# Patient Record
Sex: FEMALE | Race: OTHER | HISPANIC OR LATINO
[De-identification: names, ages, dates, MRNs, and addresses within clinical notes are randomized per-mention and may not be internally consistent; named-entity substitution may affect disease eponyms.]

---

## 2022-05-14 ENCOUNTER — TRANSCRIPTION ENCOUNTER (OUTPATIENT)
Age: 27
End: 2022-05-14

## 2022-05-15 ENCOUNTER — RESULT REVIEW (OUTPATIENT)
Age: 27
End: 2022-05-15

## 2022-05-15 ENCOUNTER — INPATIENT (INPATIENT)
Facility: HOSPITAL | Age: 27
LOS: 2 days | Discharge: HOME | End: 2022-05-18
Attending: OBSTETRICS & GYNECOLOGY | Admitting: OBSTETRICS & GYNECOLOGY
Payer: MEDICAID

## 2022-05-15 VITALS — TEMPERATURE: 97 F

## 2022-05-15 DIAGNOSIS — O26.899 OTHER SPECIFIED PREGNANCY RELATED CONDITIONS, UNSPECIFIED TRIMESTER: ICD-10-CM

## 2022-05-15 DIAGNOSIS — Z34.80 ENCOUNTER FOR SUPERVISION OF OTHER NORMAL PREGNANCY, UNSPECIFIED TRIMESTER: ICD-10-CM

## 2022-05-15 DIAGNOSIS — Z3A.00 WEEKS OF GESTATION OF PREGNANCY NOT SPECIFIED: ICD-10-CM

## 2022-05-15 LAB
ABO RH CONFIRMATION: SIGNIFICANT CHANGE UP
ALBUMIN SERPL ELPH-MCNC: 2.4 G/DL — LOW (ref 3.5–5)
ALP SERPL-CCNC: 96 U/L — SIGNIFICANT CHANGE UP (ref 40–120)
ALT FLD-CCNC: 15 U/L DA — SIGNIFICANT CHANGE UP (ref 10–60)
ANION GAP SERPL CALC-SCNC: 7 MMOL/L — SIGNIFICANT CHANGE UP (ref 5–17)
APTT BLD: 26.8 SEC — LOW (ref 27.5–35.5)
AST SERPL-CCNC: 15 U/L — SIGNIFICANT CHANGE UP (ref 10–40)
BASOPHILS # BLD AUTO: 0 K/UL — SIGNIFICANT CHANGE UP (ref 0–0.2)
BASOPHILS # BLD AUTO: 0.05 K/UL — SIGNIFICANT CHANGE UP (ref 0–0.2)
BASOPHILS NFR BLD AUTO: 0 % — SIGNIFICANT CHANGE UP (ref 0–2)
BASOPHILS NFR BLD AUTO: 0.3 % — SIGNIFICANT CHANGE UP (ref 0–2)
BILIRUB SERPL-MCNC: 0.3 MG/DL — SIGNIFICANT CHANGE UP (ref 0.2–1.2)
BLD GP AB SCN SERPL QL: SIGNIFICANT CHANGE UP
BUN SERPL-MCNC: 6 MG/DL — LOW (ref 7–18)
CALCIUM SERPL-MCNC: 9.3 MG/DL — SIGNIFICANT CHANGE UP (ref 8.4–10.5)
CHLORIDE SERPL-SCNC: 108 MMOL/L — SIGNIFICANT CHANGE UP (ref 96–108)
CO2 SERPL-SCNC: 25 MMOL/L — SIGNIFICANT CHANGE UP (ref 22–31)
CREAT SERPL-MCNC: 0.4 MG/DL — LOW (ref 0.5–1.3)
EGFR: 138 ML/MIN/1.73M2 — SIGNIFICANT CHANGE UP
EOSINOPHIL # BLD AUTO: 0.11 K/UL — SIGNIFICANT CHANGE UP (ref 0–0.5)
EOSINOPHIL # BLD AUTO: 0.21 K/UL — SIGNIFICANT CHANGE UP (ref 0–0.5)
EOSINOPHIL NFR BLD AUTO: 0.6 % — SIGNIFICANT CHANGE UP (ref 0–6)
EOSINOPHIL NFR BLD AUTO: 2 % — SIGNIFICANT CHANGE UP (ref 0–6)
GLUCOSE SERPL-MCNC: 73 MG/DL — SIGNIFICANT CHANGE UP (ref 70–99)
HBV SURFACE AG SERPL QL IA: SIGNIFICANT CHANGE UP
HCT VFR BLD CALC: 31.9 % — LOW (ref 34.5–45)
HCT VFR BLD CALC: 33.4 % — LOW (ref 34.5–45)
HCT VFR BLD CALC: 35.4 % — SIGNIFICANT CHANGE UP (ref 34.5–45)
HGB BLD-MCNC: 10 G/DL — LOW (ref 11.5–15.5)
HGB BLD-MCNC: 10.7 G/DL — LOW (ref 11.5–15.5)
HGB BLD-MCNC: 10.9 G/DL — LOW (ref 11.5–15.5)
HIV 1 & 2 AB SERPL IA.RAPID: SIGNIFICANT CHANGE UP
HIV 1+2 AB+HIV1 P24 AG SERPL QL IA: SIGNIFICANT CHANGE UP
IMM GRANULOCYTES NFR BLD AUTO: 1.1 % — SIGNIFICANT CHANGE UP (ref 0–1.5)
INR BLD: 0.97 RATIO — SIGNIFICANT CHANGE UP (ref 0.88–1.16)
LDH SERPL L TO P-CCNC: 160 U/L — SIGNIFICANT CHANGE UP (ref 120–225)
LYMPHOCYTES # BLD AUTO: 0.98 K/UL — LOW (ref 1–3.3)
LYMPHOCYTES # BLD AUTO: 3.61 K/UL — HIGH (ref 1–3.3)
LYMPHOCYTES # BLD AUTO: 35 % — SIGNIFICANT CHANGE UP (ref 13–44)
LYMPHOCYTES # BLD AUTO: 5.8 % — LOW (ref 13–44)
MCHC RBC-ENTMCNC: 23.4 PG — LOW (ref 27–34)
MCHC RBC-ENTMCNC: 24.2 PG — LOW (ref 27–34)
MCHC RBC-ENTMCNC: 24.7 PG — LOW (ref 27–34)
MCHC RBC-ENTMCNC: 30.8 GM/DL — LOW (ref 32–36)
MCHC RBC-ENTMCNC: 31.3 GM/DL — LOW (ref 32–36)
MCHC RBC-ENTMCNC: 32 GM/DL — SIGNIFICANT CHANGE UP (ref 32–36)
MCV RBC AUTO: 76 FL — LOW (ref 80–100)
MCV RBC AUTO: 77 FL — LOW (ref 80–100)
MCV RBC AUTO: 77.1 FL — LOW (ref 80–100)
MONOCYTES # BLD AUTO: 0.56 K/UL — SIGNIFICANT CHANGE UP (ref 0–0.9)
MONOCYTES # BLD AUTO: 0.93 K/UL — HIGH (ref 0–0.9)
MONOCYTES NFR BLD AUTO: 3.3 % — SIGNIFICANT CHANGE UP (ref 2–14)
MONOCYTES NFR BLD AUTO: 9 % — SIGNIFICANT CHANGE UP (ref 2–14)
NEUTROPHILS # BLD AUTO: 15.05 K/UL — HIGH (ref 1.8–7.4)
NEUTROPHILS # BLD AUTO: 5.37 K/UL — SIGNIFICANT CHANGE UP (ref 1.8–7.4)
NEUTROPHILS NFR BLD AUTO: 52 % — SIGNIFICANT CHANGE UP (ref 43–77)
NEUTROPHILS NFR BLD AUTO: 88.9 % — HIGH (ref 43–77)
NRBC # BLD: 0 /100 WBCS — SIGNIFICANT CHANGE UP (ref 0–0)
NRBC # BLD: 0 /100 WBCS — SIGNIFICANT CHANGE UP (ref 0–0)
PLATELET # BLD AUTO: 234 K/UL — SIGNIFICANT CHANGE UP (ref 150–400)
PLATELET # BLD AUTO: 245 K/UL — SIGNIFICANT CHANGE UP (ref 150–400)
PLATELET # BLD AUTO: 288 K/UL — SIGNIFICANT CHANGE UP (ref 150–400)
POTASSIUM SERPL-MCNC: 3.9 MMOL/L — SIGNIFICANT CHANGE UP (ref 3.5–5.3)
POTASSIUM SERPL-SCNC: 3.9 MMOL/L — SIGNIFICANT CHANGE UP (ref 3.5–5.3)
PROT SERPL-MCNC: 6.6 G/DL — SIGNIFICANT CHANGE UP (ref 6–8.3)
PROTHROM AB SERPL-ACNC: 11.5 SEC — SIGNIFICANT CHANGE UP (ref 10.5–13.4)
RBC # BLD: 4.14 M/UL — SIGNIFICANT CHANGE UP (ref 3.8–5.2)
RBC # BLD: 4.34 M/UL — SIGNIFICANT CHANGE UP (ref 3.8–5.2)
RBC # BLD: 4.66 M/UL — SIGNIFICANT CHANGE UP (ref 3.8–5.2)
RBC # FLD: 14.7 % — HIGH (ref 10.3–14.5)
RBC # FLD: 14.8 % — HIGH (ref 10.3–14.5)
RBC # FLD: 15.4 % — HIGH (ref 10.3–14.5)
RUBV IGG SER-ACNC: 7.7 INDEX — SIGNIFICANT CHANGE UP
RUBV IGG SER-IMP: POSITIVE — SIGNIFICANT CHANGE UP
SARS-COV-2 RNA SPEC QL NAA+PROBE: SIGNIFICANT CHANGE UP
SODIUM SERPL-SCNC: 140 MMOL/L — SIGNIFICANT CHANGE UP (ref 135–145)
URATE SERPL-MCNC: 2.7 MG/DL — SIGNIFICANT CHANGE UP (ref 2.5–7)
WBC # BLD: 10.32 K/UL — SIGNIFICANT CHANGE UP (ref 3.8–10.5)
WBC # BLD: 16.93 K/UL — HIGH (ref 3.8–10.5)
WBC # BLD: 8.47 K/UL — SIGNIFICANT CHANGE UP (ref 3.8–10.5)
WBC # FLD AUTO: 10.32 K/UL — SIGNIFICANT CHANGE UP (ref 3.8–10.5)
WBC # FLD AUTO: 16.93 K/UL — HIGH (ref 3.8–10.5)
WBC # FLD AUTO: 8.47 K/UL — SIGNIFICANT CHANGE UP (ref 3.8–10.5)

## 2022-05-15 PROCEDURE — 59514 CESAREAN DELIVERY ONLY: CPT | Mod: U7

## 2022-05-15 PROCEDURE — 58611 LIGATE OVIDUCT(S) ADD-ON: CPT

## 2022-05-15 PROCEDURE — 88302 TISSUE EXAM BY PATHOLOGIST: CPT | Mod: 26

## 2022-05-15 PROCEDURE — 88307 TISSUE EXAM BY PATHOLOGIST: CPT | Mod: 26

## 2022-05-15 RX ORDER — CITRIC ACID/SODIUM CITRATE 300-500 MG
30 SOLUTION, ORAL ORAL ONCE
Refills: 0 | Status: DISCONTINUED | OUTPATIENT
Start: 2022-05-15 | End: 2022-05-15

## 2022-05-15 RX ORDER — ACETAMINOPHEN 500 MG
1000 TABLET ORAL ONCE
Refills: 0 | Status: COMPLETED | OUTPATIENT
Start: 2022-05-15 | End: 2022-05-15

## 2022-05-15 RX ORDER — MORPHINE SULFATE 50 MG/1
4 CAPSULE, EXTENDED RELEASE ORAL EVERY 4 HOURS
Refills: 0 | Status: DISCONTINUED | OUTPATIENT
Start: 2022-05-15 | End: 2022-05-18

## 2022-05-15 RX ORDER — SENNA PLUS 8.6 MG/1
2 TABLET ORAL AT BEDTIME
Refills: 0 | Status: DISCONTINUED | OUTPATIENT
Start: 2022-05-15 | End: 2022-05-15

## 2022-05-15 RX ORDER — FERROUS SULFATE 325(65) MG
325 TABLET ORAL DAILY
Refills: 0 | Status: DISCONTINUED | OUTPATIENT
Start: 2022-05-15 | End: 2022-05-18

## 2022-05-15 RX ORDER — AZITHROMYCIN 500 MG/1
500 TABLET, FILM COATED ORAL ONCE
Refills: 0 | Status: COMPLETED | OUTPATIENT
Start: 2022-05-15 | End: 2022-05-15

## 2022-05-15 RX ORDER — ONDANSETRON 8 MG/1
4 TABLET, FILM COATED ORAL EVERY 6 HOURS
Refills: 0 | Status: DISCONTINUED | OUTPATIENT
Start: 2022-05-15 | End: 2022-05-15

## 2022-05-15 RX ORDER — SIMETHICONE 80 MG/1
80 TABLET, CHEWABLE ORAL EVERY 6 HOURS
Refills: 0 | Status: DISCONTINUED | OUTPATIENT
Start: 2022-05-15 | End: 2022-05-18

## 2022-05-15 RX ORDER — LANOLIN
1 OINTMENT (GRAM) TOPICAL EVERY 6 HOURS
Refills: 0 | Status: DISCONTINUED | OUTPATIENT
Start: 2022-05-15 | End: 2022-05-18

## 2022-05-15 RX ORDER — ACETAMINOPHEN 500 MG
975 TABLET ORAL
Refills: 0 | Status: DISCONTINUED | OUTPATIENT
Start: 2022-05-15 | End: 2022-05-18

## 2022-05-15 RX ORDER — SODIUM CHLORIDE 9 MG/ML
1000 INJECTION, SOLUTION INTRAVENOUS
Refills: 0 | Status: DISCONTINUED | OUTPATIENT
Start: 2022-05-15 | End: 2022-05-18

## 2022-05-15 RX ORDER — OXYTOCIN 10 UNIT/ML
333.33 VIAL (ML) INJECTION
Qty: 20 | Refills: 0 | Status: DISCONTINUED | OUTPATIENT
Start: 2022-05-15 | End: 2022-05-18

## 2022-05-15 RX ORDER — CEFAZOLIN SODIUM 1 G
2000 VIAL (EA) INJECTION ONCE
Refills: 0 | Status: COMPLETED | OUTPATIENT
Start: 2022-05-15 | End: 2022-05-15

## 2022-05-15 RX ORDER — HYDROMORPHONE HYDROCHLORIDE 2 MG/ML
2 INJECTION INTRAMUSCULAR; INTRAVENOUS; SUBCUTANEOUS ONCE
Refills: 0 | Status: DISCONTINUED | OUTPATIENT
Start: 2022-05-15 | End: 2022-05-15

## 2022-05-15 RX ORDER — OXYCODONE HYDROCHLORIDE 5 MG/1
5 TABLET ORAL
Refills: 0 | Status: DISCONTINUED | OUTPATIENT
Start: 2022-05-15 | End: 2022-05-18

## 2022-05-15 RX ORDER — HEPARIN SODIUM 5000 [USP'U]/ML
5000 INJECTION INTRAVENOUS; SUBCUTANEOUS EVERY 12 HOURS
Refills: 0 | Status: DISCONTINUED | OUTPATIENT
Start: 2022-05-16 | End: 2022-05-18

## 2022-05-15 RX ORDER — METOCLOPRAMIDE HCL 10 MG
10 TABLET ORAL ONCE
Refills: 0 | Status: DISCONTINUED | OUTPATIENT
Start: 2022-05-15 | End: 2022-05-15

## 2022-05-15 RX ORDER — OXYTOCIN 10 UNIT/ML
333.33 VIAL (ML) INJECTION
Qty: 20 | Refills: 0 | Status: DISCONTINUED | OUTPATIENT
Start: 2022-05-15 | End: 2022-05-15

## 2022-05-15 RX ORDER — DIPHENHYDRAMINE HCL 50 MG
25 CAPSULE ORAL EVERY 6 HOURS
Refills: 0 | Status: DISCONTINUED | OUTPATIENT
Start: 2022-05-15 | End: 2022-05-18

## 2022-05-15 RX ORDER — SODIUM CHLORIDE 9 MG/ML
1000 INJECTION, SOLUTION INTRAVENOUS
Refills: 0 | Status: DISCONTINUED | OUTPATIENT
Start: 2022-05-15 | End: 2022-05-15

## 2022-05-15 RX ORDER — IBUPROFEN 200 MG
600 TABLET ORAL EVERY 6 HOURS
Refills: 0 | Status: COMPLETED | OUTPATIENT
Start: 2022-05-15 | End: 2023-04-13

## 2022-05-15 RX ORDER — HYDROMORPHONE HYDROCHLORIDE 2 MG/ML
1 INJECTION INTRAMUSCULAR; INTRAVENOUS; SUBCUTANEOUS ONCE
Refills: 0 | Status: DISCONTINUED | OUTPATIENT
Start: 2022-05-15 | End: 2022-05-15

## 2022-05-15 RX ORDER — KETOROLAC TROMETHAMINE 30 MG/ML
30 SYRINGE (ML) INJECTION EVERY 6 HOURS
Refills: 0 | Status: DISCONTINUED | OUTPATIENT
Start: 2022-05-15 | End: 2022-05-17

## 2022-05-15 RX ORDER — MAGNESIUM HYDROXIDE 400 MG/1
30 TABLET, CHEWABLE ORAL
Refills: 0 | Status: DISCONTINUED | OUTPATIENT
Start: 2022-05-15 | End: 2022-05-18

## 2022-05-15 RX ORDER — TETANUS TOXOID, REDUCED DIPHTHERIA TOXOID AND ACELLULAR PERTUSSIS VACCINE, ADSORBED 5; 2.5; 8; 8; 2.5 [IU]/.5ML; [IU]/.5ML; UG/.5ML; UG/.5ML; UG/.5ML
0.5 SUSPENSION INTRAMUSCULAR ONCE
Refills: 0 | Status: DISCONTINUED | OUTPATIENT
Start: 2022-05-15 | End: 2022-05-18

## 2022-05-15 RX ADMIN — Medication 100 MILLIGRAM(S): at 12:45

## 2022-05-15 RX ADMIN — Medication 30 MILLIGRAM(S): at 16:59

## 2022-05-15 RX ADMIN — Medication 400 MILLIGRAM(S): at 20:49

## 2022-05-15 RX ADMIN — HYDROMORPHONE HYDROCHLORIDE 2 MILLIGRAM(S): 2 INJECTION INTRAMUSCULAR; INTRAVENOUS; SUBCUTANEOUS at 16:45

## 2022-05-15 RX ADMIN — HYDROMORPHONE HYDROCHLORIDE 2 MILLIGRAM(S): 2 INJECTION INTRAMUSCULAR; INTRAVENOUS; SUBCUTANEOUS at 16:59

## 2022-05-15 RX ADMIN — Medication 30 MILLIGRAM(S): at 22:56

## 2022-05-15 RX ADMIN — Medication 1000 MILLIGRAM(S): at 21:20

## 2022-05-15 RX ADMIN — Medication 12 MILLIGRAM(S): at 12:30

## 2022-05-15 RX ADMIN — Medication 30 MILLIGRAM(S): at 15:28

## 2022-05-15 RX ADMIN — Medication 1000 MILLIUNIT(S)/MIN: at 15:32

## 2022-05-15 RX ADMIN — HYDROMORPHONE HYDROCHLORIDE 1 MILLIGRAM(S): 2 INJECTION INTRAMUSCULAR; INTRAVENOUS; SUBCUTANEOUS at 15:39

## 2022-05-15 RX ADMIN — HYDROMORPHONE HYDROCHLORIDE 1 MILLIGRAM(S): 2 INJECTION INTRAMUSCULAR; INTRAVENOUS; SUBCUTANEOUS at 12:45

## 2022-05-15 RX ADMIN — Medication 30 MILLIGRAM(S): at 21:20

## 2022-05-15 RX ADMIN — AZITHROMYCIN 255 MILLIGRAM(S): 500 TABLET, FILM COATED ORAL at 12:39

## 2022-05-15 NOTE — CHART NOTE - NSCHARTNOTEFT_GEN_A_CORE
Pt seen at bedside offers no complaints. Denies n/v, cp; sob; palpitations; or dizziness    T(C): 36.7 (05-15-22 @ 17:01), Max: 36.8 (05-15-22 @ 11:35)  HR: 60 (05-15-22 @ 17:01) (58 - 74)  BP: 126/73 (05-15-22 @ 17:01) (118/79 - 131/80)  RR: 16 (05-15-22 @ 17:01) (12 - 18)  SpO2: 97% (05-15-22 @ 17:01) (97% - 100%)    abd: soft/nt, fundus firm, dressing in place C/D/I  pelvic: minimal lochia  ext: venodynes in place; no calf pain    a/p POD #0 s/p stat rpt c/s & btl @ 35.2 wks, complete previa   f/u 7pm cbc  cont close monitor  cont post op care  d/w dr Smith Pt seen at bedside offers no complaints. Denies n/v, cp; sob; palpitations; or dizziness    T(C): 36.7 (05-15-22 @ 17:01), Max: 36.8 (05-15-22 @ 11:35)  HR: 60 (05-15-22 @ 17:01) (58 - 74)  BP: 126/73 (05-15-22 @ 17:01) (118/79 - 131/80)  RR: 16 (05-15-22 @ 17:01) (12 - 18)  SpO2: 97% (05-15-22 @ 17:01) (97% - 100%)    abd: soft/nt, fundus firm, dressing in place C/D/I  pelvic: minimal lochia  ext: venodynes in place; no calf pain    a/p POD #0 s/p stat rpt c/s & btl @ 35.2 wks, complete previa   f/u 7pm cbc  cont close monitor  cont post op care  d/w dr Smith    addendeum with cbc resulted                        10.7   16.93 )-----------( 245      ( 15 May 2022 20:24 )             33.4 Pt seen at bedside offers no complaints. Denies n/v, cp; sob; palpitations; or dizziness    T(C): 36.7 (05-15-22 @ 17:01), Max: 36.8 (05-15-22 @ 11:35)  HR: 60 (05-15-22 @ 17:01) (58 - 74)  BP: 126/73 (05-15-22 @ 17:01) (118/79 - 131/80)  RR: 16 (05-15-22 @ 17:01) (12 - 18)  SpO2: 97% (05-15-22 @ 17:01) (97% - 100%)    abd: soft/nt, fundus firm, dressing in place C/D/I  pelvic: minimal lochia  ext: venodynes in place; no calf pain    a/p POD #0 s/p stat rpt c/s & btl @ 35.2 wks, complete previa   f/u 7pm cbc  cont close monitor  cont post op care  d/w dr Smith    addendum with cbc resulted:                        10.7   16.93 )-----------( 245      ( 15 May 2022 20:24 )             33.4

## 2022-05-16 DIAGNOSIS — Z98.891 HISTORY OF UTERINE SCAR FROM PREVIOUS SURGERY: ICD-10-CM

## 2022-05-16 LAB
BASOPHILS # BLD AUTO: 0.03 K/UL — SIGNIFICANT CHANGE UP (ref 0–0.2)
BASOPHILS NFR BLD AUTO: 0.2 % — SIGNIFICANT CHANGE UP (ref 0–2)
EOSINOPHIL # BLD AUTO: 0 K/UL — SIGNIFICANT CHANGE UP (ref 0–0.5)
EOSINOPHIL NFR BLD AUTO: 0 % — SIGNIFICANT CHANGE UP (ref 0–6)
HCT VFR BLD CALC: 23.6 % — LOW (ref 34.5–45)
HCT VFR BLD CALC: 24 % — LOW (ref 34.5–45)
HGB BLD-MCNC: 7.8 G/DL — LOW (ref 11.5–15.5)
HGB BLD-MCNC: 7.8 G/DL — LOW (ref 11.5–15.5)
IMM GRANULOCYTES NFR BLD AUTO: 0.9 % — SIGNIFICANT CHANGE UP (ref 0–1.5)
LYMPHOCYTES # BLD AUTO: 1.63 K/UL — SIGNIFICANT CHANGE UP (ref 1–3.3)
LYMPHOCYTES # BLD AUTO: 9.4 % — LOW (ref 13–44)
MCHC RBC-ENTMCNC: 24.9 PG — LOW (ref 27–34)
MCHC RBC-ENTMCNC: 25.4 PG — LOW (ref 27–34)
MCHC RBC-ENTMCNC: 32.5 GM/DL — SIGNIFICANT CHANGE UP (ref 32–36)
MCHC RBC-ENTMCNC: 33.1 GM/DL — SIGNIFICANT CHANGE UP (ref 32–36)
MCV RBC AUTO: 76.7 FL — LOW (ref 80–100)
MCV RBC AUTO: 76.9 FL — LOW (ref 80–100)
MONOCYTES # BLD AUTO: 1.25 K/UL — HIGH (ref 0–0.9)
MONOCYTES NFR BLD AUTO: 7.2 % — SIGNIFICANT CHANGE UP (ref 2–14)
NEUTROPHILS # BLD AUTO: 14.29 K/UL — HIGH (ref 1.8–7.4)
NEUTROPHILS NFR BLD AUTO: 82.3 % — HIGH (ref 43–77)
NRBC # BLD: 0 /100 WBCS — SIGNIFICANT CHANGE UP (ref 0–0)
NRBC # BLD: 0 /100 WBCS — SIGNIFICANT CHANGE UP (ref 0–0)
PLATELET # BLD AUTO: 216 K/UL — SIGNIFICANT CHANGE UP (ref 150–400)
PLATELET # BLD AUTO: 225 K/UL — SIGNIFICANT CHANGE UP (ref 150–400)
RBC # BLD: 3.07 M/UL — LOW (ref 3.8–5.2)
RBC # BLD: 3.13 M/UL — LOW (ref 3.8–5.2)
RBC # FLD: 14.9 % — HIGH (ref 10.3–14.5)
RBC # FLD: 14.9 % — HIGH (ref 10.3–14.5)
T PALLIDUM AB TITR SER: NEGATIVE — SIGNIFICANT CHANGE UP
WBC # BLD: 17.36 K/UL — HIGH (ref 3.8–10.5)
WBC # BLD: 19.41 K/UL — HIGH (ref 3.8–10.5)
WBC # FLD AUTO: 17.36 K/UL — HIGH (ref 3.8–10.5)
WBC # FLD AUTO: 19.41 K/UL — HIGH (ref 3.8–10.5)

## 2022-05-16 RX ORDER — ACETAMINOPHEN 500 MG
1000 TABLET ORAL ONCE
Refills: 0 | Status: COMPLETED | OUTPATIENT
Start: 2022-05-16 | End: 2022-05-16

## 2022-05-16 RX ADMIN — Medication 975 MILLIGRAM(S): at 04:00

## 2022-05-16 RX ADMIN — Medication 1000 MILLIGRAM(S): at 20:31

## 2022-05-16 RX ADMIN — Medication 975 MILLIGRAM(S): at 16:30

## 2022-05-16 RX ADMIN — SIMETHICONE 80 MILLIGRAM(S): 80 TABLET, CHEWABLE ORAL at 12:58

## 2022-05-16 RX ADMIN — HEPARIN SODIUM 5000 UNIT(S): 5000 INJECTION INTRAVENOUS; SUBCUTANEOUS at 18:36

## 2022-05-16 RX ADMIN — MORPHINE SULFATE 4 MILLIGRAM(S): 50 CAPSULE, EXTENDED RELEASE ORAL at 05:55

## 2022-05-16 RX ADMIN — Medication 975 MILLIGRAM(S): at 15:59

## 2022-05-16 RX ADMIN — SIMETHICONE 80 MILLIGRAM(S): 80 TABLET, CHEWABLE ORAL at 05:57

## 2022-05-16 RX ADMIN — HEPARIN SODIUM 5000 UNIT(S): 5000 INJECTION INTRAVENOUS; SUBCUTANEOUS at 02:12

## 2022-05-16 RX ADMIN — SIMETHICONE 80 MILLIGRAM(S): 80 TABLET, CHEWABLE ORAL at 23:38

## 2022-05-16 RX ADMIN — Medication 30 MILLIGRAM(S): at 13:15

## 2022-05-16 RX ADMIN — MORPHINE SULFATE 4 MILLIGRAM(S): 50 CAPSULE, EXTENDED RELEASE ORAL at 06:22

## 2022-05-16 RX ADMIN — MORPHINE SULFATE 4 MILLIGRAM(S): 50 CAPSULE, EXTENDED RELEASE ORAL at 01:30

## 2022-05-16 RX ADMIN — Medication 30 MILLIGRAM(S): at 18:38

## 2022-05-16 RX ADMIN — Medication 400 MILLIGRAM(S): at 20:01

## 2022-05-16 RX ADMIN — SIMETHICONE 80 MILLIGRAM(S): 80 TABLET, CHEWABLE ORAL at 00:44

## 2022-05-16 RX ADMIN — MORPHINE SULFATE 4 MILLIGRAM(S): 50 CAPSULE, EXTENDED RELEASE ORAL at 01:00

## 2022-05-16 RX ADMIN — SIMETHICONE 80 MILLIGRAM(S): 80 TABLET, CHEWABLE ORAL at 18:38

## 2022-05-16 RX ADMIN — Medication 30 MILLIGRAM(S): at 18:51

## 2022-05-16 RX ADMIN — Medication 1 TABLET(S): at 13:00

## 2022-05-16 RX ADMIN — Medication 30 MILLIGRAM(S): at 23:38

## 2022-05-16 RX ADMIN — Medication 975 MILLIGRAM(S): at 03:43

## 2022-05-16 RX ADMIN — Medication 30 MILLIGRAM(S): at 12:59

## 2022-05-16 RX ADMIN — MAGNESIUM HYDROXIDE 30 MILLILITER(S): 400 TABLET, CHEWABLE ORAL at 16:00

## 2022-05-16 NOTE — LACTATION INITIAL EVALUATION - LACTATION INTERVENTIONS
Mom taught hand expression and provided with electric breast pump.  Instructions given for use, frequency and duration, collection and storage and cleaning of kit parts.  Reinforced importance of stimulating/expressing 8-12X/24 hours for the establishment, maintenance and expression of breastmilk. while  from  ; encouraged to visit/breastfeed baby in NICU as able/initiate/review hand expression/initiate/review pumping guidelines and safe milk handling/review techniques to increase milk supply

## 2022-05-16 NOTE — PROGRESS NOTE ADULT - SUBJECTIVE AND OBJECTIVE BOX
Patient seen at bedisde resting comfortably offers no new complaints. + Ambulation, voiding without difficulty, + flatus; tolerating regular diet. both breast and bottle feeding. Denies HA, CP, SOB, N/V/D,  no bm; dizziness, palpitations, worsening abdominal pain, worsening vaginal bleeding, or any other concerns.     Vital Signs Last 24 Hrs  T(C): 37.2 (16 May 2022 05:00), Max: 37.4 (16 May 2022 00:51)  T(F): 98.9 (16 May 2022 05:00), Max: 99.4 (16 May 2022 00:51)  HR: 87 (16 May 2022 05:00) (58 - 96)  BP: 120/78 (16 May 2022 05:00) (104/72 - 131/80)  BP(mean): 92 (16 May 2022 05:00) (90 - 92)  RR: 18 (16 May 2022 05:00) (12 - 18)  SpO2: 98% (16 May 2022 05:00) (95% - 100%)    Gen: A&O x 3, NAD  Chest: CTA B/L  Cardiac: S1,S2  RRR  Breast: Soft, nontender, nonengorged  Abdomen: +BS; soft; Nontender, nondistended; dressing removed incision C/D/I  Gyn: minimal lochia   Extremities: Nontender, no worsening edema;                           7.8    17.36 )-----------( 225      ( 16 May 2022 06:49 )             24.0       A/P: POD #1 s/p stat rpt c/s and btl @35.2wks; with acute on blood loss anemia s/p 2units prbcs  iron po   --Pain management as needed  -Advance as per protocol  -OOB and ambulate  -f/u Rpt CBC   -DC tellez f/u void  -Advance diet with flatus  -Encourage breastfeeding   -d/w dr. marlene rios Patient seen at bedisde resting comfortably offers no new complaints. + Ambulation, voiding without difficulty, + flatus; tolerating regular diet. both breast and bottle feeding. Denies HA, CP, SOB, N/V/D,  no bm; dizziness, palpitations, worsening abdominal pain, worsening vaginal bleeding, or any other concerns.     Vital Signs Last 24 Hrs  T(C): 37.2 (16 May 2022 05:00), Max: 37.4 (16 May 2022 00:51)  T(F): 98.9 (16 May 2022 05:00), Max: 99.4 (16 May 2022 00:51)  HR: 87 (16 May 2022 05:00) (58 - 96)  BP: 120/78 (16 May 2022 05:00) (104/72 - 131/80)  BP(mean): 92 (16 May 2022 05:00) (90 - 92)  RR: 18 (16 May 2022 05:00) (12 - 18)  SpO2: 98% (16 May 2022 05:00) (95% - 100%)    Gen: A&O x 3, NAD  Chest: CTA B/L  Cardiac: S1,S2  RRR  Breast: Soft, nontender, nonengorged  Abdomen: +BS; soft; Nontender, nondistended; dressing removed incision C/D/I  Gyn: minimal lochia   Extremities: Nontender, no worsening edema;                           7.8    17.36 )-----------( 225      ( 16 May 2022 06:49 )             24.0       A/P: POD #1 s/p stat rpt c/s and btl @35.2wks; with acute on blood loss anemia s/p 2units prbcs  iron po   orthostatics  rpt cbc 1pm  --Pain management as needed  -Advance as per protocol  -OOB and ambulate  -DC tellez f/u void  -Advance diet with flatus  -Encourage breastfeeding   -d/w dr. marlene rios

## 2022-05-16 NOTE — CHART NOTE - NSCHARTNOTEFT_GEN_A_CORE
RN informed PA that pt complaining of persistent abdominal/gas pain   Pt seen and evaluated at bedside. Pt reports persistent abdominal pain all day alleviated with medication. Pt reports pain radiates to her shoulders and worse with ambulation. Pt appears A&O x3; NAD. Pain now controlled. Pt denies flatus, or bm. Pt states she has been walking all morning w/out any difficulty, noting increased pain w change of positions.  Denies headache, chest pain, shortness of breath, palpitations, dizziness, lower extremity paresthesia, lower extremity pain, change in vision, weakness, dizziness    Vital Signs Last 24 Hrs  T(F): 98 (05-16-22 @ 18:13), Max: 99.4 (05-16-22 @ 00:51)  HR: 110 (05-16-22 @ 18:13) (87 - 110)  BP: 115/75 (05-16-22 @ 18:13) (89/60 - 120/78)  RR: 18 (05-16-22 @ 18:13) (16 - 18)  SpO2: 99% (05-16-22 @ 18:13) (95% - 99%)    Gen: slightly pale appearing but A&O x3; NAD  lungs: CTA b/l  card: RRR  abd: dec. bs x4 w soft distention; appropriate incisional tenderness, no rebound or guarding; incision C/D/I   pelvic: min-moderate lochia                   7.8    19.41  )----------(  216       ( 16 May 2022 12:52 )               23.6        a/p 37yo pod#1 s/p stat repeat c/s & BTL @ 35.2weeks, complete placenta previa, ebl 2 liters, s/p 2u prbc intraop, acute blood loss anemia, pt stable  - cont postop care   - cont pad checks  - cont regular diet   - cbc am   - iron, vitamin c, prenatals   - dvt ppx heparin, oob and ambulate  - d/w dr. hassan  [id number]    RN informed PA that pt complaining of persistent abdominal/gas pain   Pt seen and evaluated at bedside. Pt reports persistent abdominal pain all day alleviated with medication. Pt reports pain radiates to her shoulders and worse with ambulation. Pt appears A&O x3; NAD. Pain now controlled. Pt denies flatus, or bm. Pt states she has been walking all morning w/out any difficulty, noting increased pain w change of positions.  Denies headache, chest pain, shortness of breath, palpitations, dizziness, lower extremity paresthesia, lower extremity pain, change in vision, weakness, dizziness    Vital Signs Last 24 Hrs  T(F): 98 (05-16-22 @ 18:13), Max: 99.4 (05-16-22 @ 00:51)  HR: 110 (05-16-22 @ 18:13) (87 - 110)  BP: 115/75 (05-16-22 @ 18:13) (89/60 - 120/78)  RR: 18 (05-16-22 @ 18:13) (16 - 18)  SpO2: 99% (05-16-22 @ 18:13) (95% - 99%)    Gen: slightly pale appearing but A&O x3; NAD  lungs: CTA b/l  card: RRR  abd: dec. bs x4 w soft distention; appropriate incisional tenderness, no rebound or guarding; incision C/D/I   pelvic: min-moderate lochia                   7.8    19.41  )----------(  216       ( 16 May 2022 12:52 )               23.6        a/p 35yo pod#1 s/p stat repeat c/s & BTL @ 35.2weeks, complete placenta previa, ebl 2 liters, s/p 2u prbc intraop, acute blood loss anemia, pt stable  - cont postop care   - cont pad checks  - cont regular diet   - cbc am   - iron, vitamin c, prenatals   - dvt ppx heparin, oob and ambulate  - d/w dr. hassan

## 2022-05-16 NOTE — CHART NOTE - NSCHARTNOTEFT_GEN_A_CORE
Pt seen at bedside offers no complaints. Denies n/v, cp; sob; palpitations; or dizziness    T(C): 37.4 (05-16-22 @ 00:51), Max: 37.4 (05-16-22 @ 00:51)  HR: 96 (05-16-22 @ 00:51) (58 - 96)  BP: 113/78 (05-16-22 @ 00:51) (104/72 - 131/80)  RR: 18 (05-16-22 @ 00:51) (12 - 18)  SpO2: 98% (05-16-22 @ 00:51) (95% - 100%)    abd: soft/nt, fundus firm, dressing in place C/D/I  pelvic: minimal lochia  ext: venodynes in place; no calf pain                          10.7   16.93 )-----------( 245      ( 15 May 2022 20:24 )             33.4       a/p POD #1 s/p prim c/s @35.2wks for complete previa  cont close monitor   cont post op care  d/w dr Smith

## 2022-05-17 ENCOUNTER — TRANSCRIPTION ENCOUNTER (OUTPATIENT)
Age: 27
End: 2022-05-17

## 2022-05-17 DIAGNOSIS — D62 ACUTE POSTHEMORRHAGIC ANEMIA: ICD-10-CM

## 2022-05-17 LAB
ANISOCYTOSIS BLD QL: SLIGHT — SIGNIFICANT CHANGE UP
BASOPHILS # BLD AUTO: 0.05 K/UL — SIGNIFICANT CHANGE UP (ref 0–0.2)
BASOPHILS NFR BLD AUTO: 0.3 % — SIGNIFICANT CHANGE UP (ref 0–2)
EOSINOPHIL # BLD AUTO: 0.01 K/UL — SIGNIFICANT CHANGE UP (ref 0–0.5)
EOSINOPHIL NFR BLD AUTO: 0.1 % — SIGNIFICANT CHANGE UP (ref 0–6)
HCT VFR BLD CALC: 21 % — CRITICAL LOW (ref 34.5–45)
HCT VFR BLD CALC: 25.5 % — LOW (ref 34.5–45)
HGB BLD-MCNC: 6.7 G/DL — CRITICAL LOW (ref 11.5–15.5)
HGB BLD-MCNC: 8.3 G/DL — LOW (ref 11.5–15.5)
IMM GRANULOCYTES NFR BLD AUTO: 1 % — SIGNIFICANT CHANGE UP (ref 0–1.5)
LYMPHOCYTES # BLD AUTO: 13.7 % — SIGNIFICANT CHANGE UP (ref 13–44)
LYMPHOCYTES # BLD AUTO: 2.39 K/UL — SIGNIFICANT CHANGE UP (ref 1–3.3)
MANUAL SMEAR VERIFICATION: SIGNIFICANT CHANGE UP
MCHC RBC-ENTMCNC: 25.3 PG — LOW (ref 27–34)
MCHC RBC-ENTMCNC: 25.4 PG — LOW (ref 27–34)
MCHC RBC-ENTMCNC: 31.9 GM/DL — LOW (ref 32–36)
MCHC RBC-ENTMCNC: 32.5 GM/DL — SIGNIFICANT CHANGE UP (ref 32–36)
MCV RBC AUTO: 78 FL — LOW (ref 80–100)
MCV RBC AUTO: 79.2 FL — LOW (ref 80–100)
MONOCYTES # BLD AUTO: 1.34 K/UL — HIGH (ref 0–0.9)
MONOCYTES NFR BLD AUTO: 7.7 % — SIGNIFICANT CHANGE UP (ref 2–14)
NEUTROPHILS # BLD AUTO: 13.42 K/UL — HIGH (ref 1.8–7.4)
NEUTROPHILS NFR BLD AUTO: 77.2 % — HIGH (ref 43–77)
NRBC # BLD: 0 /100 WBCS — SIGNIFICANT CHANGE UP (ref 0–0)
NRBC # BLD: 0 /100 WBCS — SIGNIFICANT CHANGE UP (ref 0–0)
PLAT MORPH BLD: NORMAL — SIGNIFICANT CHANGE UP
PLATELET # BLD AUTO: 216 K/UL — SIGNIFICANT CHANGE UP (ref 150–400)
PLATELET # BLD AUTO: 239 K/UL — SIGNIFICANT CHANGE UP (ref 150–400)
PLATELET COUNT - ESTIMATE: NORMAL — SIGNIFICANT CHANGE UP
POIKILOCYTOSIS BLD QL AUTO: SLIGHT — SIGNIFICANT CHANGE UP
RBC # BLD: 2.65 M/UL — LOW (ref 3.8–5.2)
RBC # BLD: 3.27 M/UL — LOW (ref 3.8–5.2)
RBC # FLD: 15.6 % — HIGH (ref 10.3–14.5)
RBC # FLD: 16.4 % — HIGH (ref 10.3–14.5)
RBC BLD AUTO: ABNORMAL
WBC # BLD: 13.54 K/UL — HIGH (ref 3.8–10.5)
WBC # BLD: 17.39 K/UL — HIGH (ref 3.8–10.5)
WBC # FLD AUTO: 13.54 K/UL — HIGH (ref 3.8–10.5)
WBC # FLD AUTO: 17.39 K/UL — HIGH (ref 3.8–10.5)

## 2022-05-17 RX ORDER — IBUPROFEN 200 MG
600 TABLET ORAL EVERY 6 HOURS
Refills: 0 | Status: DISCONTINUED | OUTPATIENT
Start: 2022-05-17 | End: 2022-05-18

## 2022-05-17 RX ORDER — DOCUSATE SODIUM 100 MG
1 CAPSULE ORAL
Qty: 30 | Refills: 0
Start: 2022-05-17

## 2022-05-17 RX ORDER — IBUPROFEN 200 MG
1 TABLET ORAL
Qty: 20 | Refills: 0
Start: 2022-05-17

## 2022-05-17 RX ORDER — FERROUS SULFATE 325(65) MG
1 TABLET ORAL
Qty: 30 | Refills: 0
Start: 2022-05-17 | End: 2022-06-15

## 2022-05-17 RX ADMIN — HEPARIN SODIUM 5000 UNIT(S): 5000 INJECTION INTRAVENOUS; SUBCUTANEOUS at 18:31

## 2022-05-17 RX ADMIN — SIMETHICONE 80 MILLIGRAM(S): 80 TABLET, CHEWABLE ORAL at 05:50

## 2022-05-17 RX ADMIN — Medication 600 MILLIGRAM(S): at 12:41

## 2022-05-17 RX ADMIN — Medication 325 MILLIGRAM(S): at 12:41

## 2022-05-17 RX ADMIN — SIMETHICONE 80 MILLIGRAM(S): 80 TABLET, CHEWABLE ORAL at 12:40

## 2022-05-17 RX ADMIN — Medication 975 MILLIGRAM(S): at 15:35

## 2022-05-17 RX ADMIN — HEPARIN SODIUM 5000 UNIT(S): 5000 INJECTION INTRAVENOUS; SUBCUTANEOUS at 05:50

## 2022-05-17 RX ADMIN — Medication 600 MILLIGRAM(S): at 13:05

## 2022-05-17 RX ADMIN — Medication 975 MILLIGRAM(S): at 09:37

## 2022-05-17 RX ADMIN — Medication 600 MILLIGRAM(S): at 23:47

## 2022-05-17 RX ADMIN — SIMETHICONE 80 MILLIGRAM(S): 80 TABLET, CHEWABLE ORAL at 18:30

## 2022-05-17 RX ADMIN — Medication 975 MILLIGRAM(S): at 10:05

## 2022-05-17 RX ADMIN — Medication 30 MILLIGRAM(S): at 00:08

## 2022-05-17 RX ADMIN — Medication 30 MILLIGRAM(S): at 06:20

## 2022-05-17 RX ADMIN — Medication 1 TABLET(S): at 12:41

## 2022-05-17 RX ADMIN — Medication 30 MILLIGRAM(S): at 05:50

## 2022-05-17 RX ADMIN — Medication 975 MILLIGRAM(S): at 16:30

## 2022-05-17 NOTE — DISCHARGE NOTE OB - CARE PROVIDER_API CALL
[facility],   [address] [facility] [address]  Phone: [phone]  Fax: (   )    -  Follow Up Time: 1 week

## 2022-05-17 NOTE — DISCHARGE NOTE OB - PROVIDER TOKENS
FREE:[LAST:[[facility]],PHONE:[phone]],FAX:[(   [phone]    -],ADDRESS:[[address] [place] [address]],FOLLOWUP:[1 week]]

## 2022-05-17 NOTE — DISCHARGE NOTE OB - NS MD DC FALL RISK RISK
For information on Fall & Injury Prevention, visit: [email or web address] OR  [email or web address] OR  https://www.cdc.gov/steadi/patient.html

## 2022-05-17 NOTE — DISCHARGE NOTE OB - MEDICATION SUMMARY - MEDICATIONS TO TAKE
I will START or STAY ON the medications listed below when I get home from the hospital:    ibuprofen 600 mg oral tablet  -- 1 tab(s) by mouth every 6 hours   -- Do not take this drug if you are pregnant.  It is very important that you take or use this exactly as directed.  Do not skip doses or discontinue unless directed by your doctor.  May cause drowsiness or dizziness.  Obtain medical advice before taking any non-prescription drugs as some may affect the action of this medication.  Take with food or milk.    -- Indication: For Pain    Prenatal Plus Low Iron oral tablet  -- 1 tab(s) by mouth once a day   -- May discolor urine or feces.  Take with food or milk.    -- Indication: For Supplementation    ferrous sulfate 325 mg (65 mg elemental iron) oral tablet  -- 1 tab(s) by mouth once a day   -- Check with your doctor before becoming pregnant.  Do not chew, break, or crush.  May discolor urine or feces.    -- Indication: For anemia     Colace 100 mg oral capsule  -- 1 cap(s) by mouth once a day   -- Medication should be taken with plenty of water.    -- Indication: For constipation

## 2022-05-17 NOTE — DISCHARGE NOTE OB - SECONDARY DIAGNOSIS.
Postoperative anemia due to acute blood loss Status post tubal ligation at time of delivery, current hosp  labor with delivery

## 2022-05-17 NOTE — DISCHARGE NOTE OB - CONDITION (STATED IN TERMS THAT PERMIT A SPECIFIC MEASURABLE COMPARISON WITH CONDITION ON ADMISSION):
stable Dermal Autograft Text: The defect edges were debeveled with a #15 scalpel blade.  Given the location of the defect, shape of the defect and the proximity to free margins a dermal autograft was deemed most appropriate.  Using a sterile surgical marker, the primary defect shape was transferred to the donor site. The area thus outlined was incised deep to adipose tissue with a #15 scalpel blade.  The harvested graft was then trimmed of adipose and epidermal tissue until only dermis was left.  The skin graft was then placed in the primary defect and oriented appropriately.

## 2022-05-17 NOTE — PROGRESS NOTE ADULT - SUBJECTIVE AND OBJECTIVE BOX
Patient seen at bedside resting comfortably offers no new complaints. + Ambulation, + void without difficulty, + flatus;  + bm; tolerating regular diet. bottle feeding. Denies HA, blurry vision or epigastric pain, CP, SOB, N/V/D,  dizziness, palpitations, worsening vaginal bleeding.    Vital Signs Last 24 Hrs  T(C): 36.9 (17 May 2022 05:08), Max: 36.9 (17 May 2022 05:08)  T(F): 98.5 (17 May 2022 05:08), Max: 98.5 (17 May 2022 05:08)  HR: 98 (17 May 2022 05:08) (98 - 110)  BP: 101/56 (17 May 2022 05:08) (101/56 - 115/75)  BP(mean): --  RR: 17 (17 May 2022 05:08) (17 - 18)  SpO2: 98% (17 May 2022 05:08) (98% - 99%)    Orthostatic VS    05-17-22 @ 08:55  Lying BP: 114/72 HR: 98   Sitting BP: 116/75 HR: 105  Standing BP: 115/77 HR: 111  Site: --   Mode: --    Gen: A&O x 3, NAD  Chest: CTABL  Cardiac: S1, S2, RRR  Breast: Soft, nontender, nonengorged  Abdomen: +BS; soft; Nontender, nondistended, Incision C/D/I steri strips in place   Gyn: Minimal lochia  Extremities: Nontender, DTRS 2+, no worsening edema                          6.7    17.39 )-----------( 239      ( 17 May 2022 06:50 )             21.0

## 2022-05-17 NOTE — PROVIDER CONTACT NOTE (CRITICAL VALUE NOTIFICATION) - BACKGROUND
Patient had a repeat  where initial H & H was 10.0/31.9 and began to hit a downward trend to current results

## 2022-05-17 NOTE — DISCHARGE NOTE OB - PATIENT PORTAL LINK FT
You can access the FollowMyHealth Patient Portal offered by [facility] by registering at the following website: [email or web address]. By joining [facility] FollowMyHealth portal, you will also be able to view your health information using other applications (apps) compatible with our system.

## 2022-05-17 NOTE — DISCHARGE NOTE OB - CARE PLAN
1 Principal Discharge DX:	Status post  section  Assessment and plan of treatment:	Continue breastfeeding.  Motrin as needed for pain.  Ambulate daily.  No heavy lifting or anything per vagina x 6 weeks - no sex, tampons, douching, tub baths, etc.  Follow up in office in 2 weeks for incision check, and then at 6 weeks for postpartum check.  Secondary Diagnosis:	Status post tubal ligation at time of delivery, current hosp  Assessment and plan of treatment:	Continue breastfeeding.  Motrin as needed for pain.  Ambulate daily.  No heavy lifting or anything per vagina x 6 weeks - no sex, tampons, douching, tub baths, etc.  Follow up in office in 2 weeks for incision check, and then at 6 weeks for postpartum check.  Secondary Diagnosis:	Postoperative anemia due to acute blood loss  Assessment and plan of treatment:	take iron, folic acid, vitamin C, and prenatal vitamins. eat iron fortified food   Principal Discharge DX:	Status post  section  Assessment and plan of treatment:	Continue breastfeeding.  Motrin as needed for pain.  Ambulate daily.  No heavy lifting or anything per vagina x 6 weeks - no sex, tampons, douching, tub baths, etc.  Follow up in office in 2 weeks for incision check, and then at 6 weeks for postpartum check.  Secondary Diagnosis:	Status post tubal ligation at time of delivery, current hosp  Assessment and plan of treatment:	Continue breastfeeding.  Motrin as needed for pain.  Ambulate daily.  No heavy lifting or anything per vagina x 6 weeks - no sex, tampons, douching, tub baths, etc.  Follow up in office in 2 weeks for incision check, and then at 6 weeks for postpartum check.  Secondary Diagnosis:	Postoperative anemia due to acute blood loss  Assessment and plan of treatment:	s/p blood transfusions, take iron, folic acid, vitamin C, and prenatal vitamins. eat iron fortified food   Principal Discharge DX:	Status post  section  Assessment and plan of treatment:	Continue breastfeeding.  Motrin as needed for pain.  Ambulate daily.  No heavy lifting or anything per vagina x 6 weeks - no sex, tampons, douching, tub baths, etc.  Follow up in office in 2 weeks for incision check, and then at 6 weeks for postpartum check.  Secondary Diagnosis:	Status post tubal ligation at time of delivery, current hosp  Assessment and plan of treatment:	Continue breastfeeding.  Motrin as needed for pain.  Ambulate daily.  No heavy lifting or anything per vagina x 6 weeks - no sex, tampons, douching, tub baths, etc.  Follow up in office in 2 weeks for incision check, and then at 6 weeks for postpartum check.  Secondary Diagnosis:	Postoperative anemia due to acute blood loss  Assessment and plan of treatment:	s/p blood transfusions, take iron, folic acid, vitamin C, and prenatal vitamins. eat iron fortified food  Secondary Diagnosis:	 labor with delivery

## 2022-05-17 NOTE — DISCHARGE NOTE OB - PLAN OF CARE
Continue breastfeeding.  Motrin as needed for pain.  Ambulate daily.  No heavy lifting or anything per vagina x 6 weeks - no sex, tampons, douching, tub baths, etc.  Follow up in office in 2 weeks for incision check, and then at 6 weeks for postpartum check. take iron, folic acid, vitamin C, and prenatal vitamins. eat iron fortified food s/p blood transfusions, take iron, folic acid, vitamin C, and prenatal vitamins. eat iron fortified food

## 2022-05-17 NOTE — DISCHARGE NOTE OB - NS DC INTERPRETER YES NO FREE
[place]um[place] Pregnancy And Lactation Text: The risk during pregnancy and breastfeeding is uncertain with this medication. Yes

## 2022-05-17 NOTE — DISCHARGE NOTE OB - HOSPITAL COURSE
pt presented w vb had placenta previa   s/p stat c/s   normal post op course pt presented w vb had placenta previa   s/p stat c/s   acute blood loss anemia requiring blood tranfusion 4 units

## 2022-05-18 VITALS
OXYGEN SATURATION: 98 % | SYSTOLIC BLOOD PRESSURE: 125 MMHG | HEART RATE: 84 BPM | TEMPERATURE: 99 F | DIASTOLIC BLOOD PRESSURE: 85 MMHG | RESPIRATION RATE: 16 BRPM

## 2022-05-18 LAB
HCT VFR BLD CALC: 24.9 % — LOW (ref 34.5–45)
HGB BLD-MCNC: 8.1 G/DL — LOW (ref 11.5–15.5)
MCHC RBC-ENTMCNC: 25.4 PG — LOW (ref 27–34)
MCHC RBC-ENTMCNC: 32.5 GM/DL — SIGNIFICANT CHANGE UP (ref 32–36)
MCV RBC AUTO: 78.1 FL — LOW (ref 80–100)
NRBC # BLD: 0 /100 WBCS — SIGNIFICANT CHANGE UP (ref 0–0)
PLATELET # BLD AUTO: 208 K/UL — SIGNIFICANT CHANGE UP (ref 150–400)
RBC # BLD: 3.19 M/UL — LOW (ref 3.8–5.2)
RBC # FLD: 17.2 % — HIGH (ref 10.3–14.5)
WBC # BLD: 11.05 K/UL — HIGH (ref 3.8–10.5)
WBC # FLD AUTO: 11.05 K/UL — HIGH (ref 3.8–10.5)

## 2022-05-18 PROCEDURE — 88307 TISSUE EXAM BY PATHOLOGIST: CPT

## 2022-05-18 PROCEDURE — 84550 ASSAY OF BLOOD/URIC ACID: CPT

## 2022-05-18 PROCEDURE — 87389 HIV-1 AG W/HIV-1&-2 AB AG IA: CPT

## 2022-05-18 PROCEDURE — 85025 COMPLETE CBC W/AUTO DIFF WBC: CPT

## 2022-05-18 PROCEDURE — 86850 RBC ANTIBODY SCREEN: CPT

## 2022-05-18 PROCEDURE — 83615 LACTATE (LD) (LDH) ENZYME: CPT

## 2022-05-18 PROCEDURE — 87340 HEPATITIS B SURFACE AG IA: CPT

## 2022-05-18 PROCEDURE — 59050 FETAL MONITOR W/REPORT: CPT

## 2022-05-18 PROCEDURE — 86780 TREPONEMA PALLIDUM: CPT

## 2022-05-18 PROCEDURE — 36415 COLL VENOUS BLD VENIPUNCTURE: CPT

## 2022-05-18 PROCEDURE — 86762 RUBELLA ANTIBODY: CPT

## 2022-05-18 PROCEDURE — 86900 BLOOD TYPING SEROLOGIC ABO: CPT

## 2022-05-18 PROCEDURE — 86901 BLOOD TYPING SEROLOGIC RH(D): CPT

## 2022-05-18 PROCEDURE — 86703 HIV-1/HIV-2 1 RESULT ANTBDY: CPT

## 2022-05-18 PROCEDURE — 85730 THROMBOPLASTIN TIME PARTIAL: CPT

## 2022-05-18 PROCEDURE — 88302 TISSUE EXAM BY PATHOLOGIST: CPT

## 2022-05-18 PROCEDURE — P9040: CPT

## 2022-05-18 PROCEDURE — 87635 SARS-COV-2 COVID-19 AMP PRB: CPT

## 2022-05-18 PROCEDURE — 36430 TRANSFUSION BLD/BLD COMPNT: CPT

## 2022-05-18 PROCEDURE — 59025 FETAL NON-STRESS TEST: CPT

## 2022-05-18 PROCEDURE — 80053 COMPREHEN METABOLIC PANEL: CPT

## 2022-05-18 PROCEDURE — 85027 COMPLETE CBC AUTOMATED: CPT

## 2022-05-18 PROCEDURE — 86923 COMPATIBILITY TEST ELECTRIC: CPT

## 2022-05-18 PROCEDURE — 85610 PROTHROMBIN TIME: CPT

## 2022-05-18 PROCEDURE — G0463: CPT

## 2022-05-18 RX ADMIN — SIMETHICONE 80 MILLIGRAM(S): 80 TABLET, CHEWABLE ORAL at 11:35

## 2022-05-18 RX ADMIN — Medication 600 MILLIGRAM(S): at 13:41

## 2022-05-18 RX ADMIN — Medication 975 MILLIGRAM(S): at 08:19

## 2022-05-18 RX ADMIN — Medication 600 MILLIGRAM(S): at 07:28

## 2022-05-18 RX ADMIN — Medication 1 TABLET(S): at 11:35

## 2022-05-18 RX ADMIN — Medication 600 MILLIGRAM(S): at 00:15

## 2022-05-18 RX ADMIN — Medication 975 MILLIGRAM(S): at 17:50

## 2022-05-18 RX ADMIN — Medication 975 MILLIGRAM(S): at 18:24

## 2022-05-18 RX ADMIN — HEPARIN SODIUM 5000 UNIT(S): 5000 INJECTION INTRAVENOUS; SUBCUTANEOUS at 05:47

## 2022-05-18 RX ADMIN — Medication 600 MILLIGRAM(S): at 14:15

## 2022-05-18 RX ADMIN — Medication 600 MILLIGRAM(S): at 05:45

## 2022-05-18 RX ADMIN — Medication 325 MILLIGRAM(S): at 11:35

## 2022-05-18 RX ADMIN — Medication 975 MILLIGRAM(S): at 08:50

## 2022-05-18 NOTE — CHART NOTE - NSCHARTNOTEFT_GEN_A_CORE
Pt seen with Dr. Aguilar  Seated comfortably in chair and eating fruit.  States she feels better and no complaints of recurrent dizziness episode.  Ambulated within room and hallway with no complaints.    cbc                        8.1    11.05 )-----------( 208      ( 18 May 2022 16:53 )             24.9     Offered patient to stay until tomorrow as precaution, patient declines at this time but agrees to discuss with partner and will reassess in 1 hour for further discharge planning.

## 2022-05-18 NOTE — PROGRESS NOTE ADULT - PROBLEM SELECTOR PLAN 1
A/P: POD #1 s/p stat rpt c/s and btl @35.2wks; with acute on blood loss anemia s/p 2units prbcs  iron po   --Pain management as needed  -Advance as per protocol  -OOB and ambulate  -f/u Rpt CBC   -DC tellez f/u void  -Advance diet with flatus  -Encourage breastfeeding   -d/w dr. marlene rios
Discharge instructions verbalized  DC home
-Pain management as needed  -cont post op care  -OOB and ambulate  -f/u Rpt CBC   -encourage insentive spirometer use  -Encourage breastfeeding   -2units PRBC ordered and consented pt agrees  -d/w dr Dunlap

## 2022-05-18 NOTE — PROGRESS NOTE ADULT - ASSESSMENT
26y s/p stat rpt C/S and BTL POD#3, acute blood loss anemia s/p blood transfusion stable    
A/P: POD #1 s/p stat rpt c/s and btl @35.2wks; with acute on blood loss anemia s/p 2units prbcs  iron po   --Pain management as needed  -Advance as per protocol  -OOB and ambulate  -f/u Rpt CBC   -DC tellez f/u void  -Advance diet with flatus  -Encourage breastfeeding   -d/w dr. marlene rios  
A/P: POD #2 s/p stat rpt c/s & BTL for placenta previa, acute blood loss anemia; s/p 2units PRBC intraop

## 2022-05-18 NOTE — PROGRESS NOTE ADULT - PROBLEM SELECTOR PROBLEM 2
Status post tubal ligation at time of delivery, current hospitalization

## 2022-05-18 NOTE — PROGRESS NOTE ADULT - REASON FOR ADMISSION
siup at 35.2wks with complete previa with vaginal bleeding
early labor, complete previa at 35.2wks
vaginal bleeding

## 2022-05-18 NOTE — PROGRESS NOTE ADULT - SUBJECTIVE AND OBJECTIVE BOX
Patient evaluated at bedside, offers no acute complaints.  Resting comfortably with baby at bedside.  Tolerating regular diet, Voiding without difficulty; +flatus, +BM  Currently breast and bottlefeeding.  Denies fever/chills, nausea/vomiting, headache, dizziness, chest pains, shortness of breath, palpitations    Vital Signs Last 24 Hrs  T(C): 36.8 (18 May 2022 04:05), Max: 37.2 (17 May 2022 12:50)  T(F): 98.3 (18 May 2022 04:05), Max: 99 (17 May 2022 12:50)  HR: 88 (18 May 2022 04:05) (88 - 115)  BP: 102/62 (18 May 2022 04:05) (102/62 - 124/64)  RR: 18 (18 May 2022 04:05) (14 - 18)  SpO2: 98% (18 May 2022 04:05) (98% - 99%)    PE: Pt appears comfortable, NAD, AAOx3  Chest: CTA bilaterally, no W/R/R  Cardiac: RRR  Breasts: soft, NT/nonengorged bilaterally; no masses, no erythema  Abd: soft; NT; no guarding or rebound; +BS x4 quad; Fundus firm NT; incision C/D/I with steristrips in place, no erythema, no wound drainage or bleeding, no swelling  Gyn: minimal  Ext: soft, NT; DTRs 2+ bilaterally; no worsening edema                          8.3    13.54 )-----------( 216      ( 17 May 2022 21:01 )             25.5       d/w Dr. Aguilar, attending on call

## 2022-05-18 NOTE — CHART NOTE - NSCHARTNOTEFT_GEN_A_CORE
Pt states she is feeling well and requests discharge at this time.  Reviewed risks and precautions and agrees to follow up as necessary.  Encouraged rest, hydration and pain mgmt as needed.  Discharge instructions verbalized and cleared for DC home per Dr. Aguilar

## 2022-05-18 NOTE — PROGRESS NOTE ADULT - PROBLEM SELECTOR PLAN 3
s/p blood transfusion, complete 4 units PRBC  stable and asymptomatic
-Pain management as needed  -cont post op care  -OOB and ambulate  -f/u Rpt CBC   -encourage insentive spirometer use  -Encourage breastfeeding   -2units PRBC ordered and consented pt agrees  -d/w dr Dunlap
(1) Oriented to own ability

## 2022-05-18 NOTE — PROGRESS NOTE ADULT - PROBLEM SELECTOR PLAN 2
Discharge instructions verbalized  DC home
-Pain management as needed  -cont post op care  -OOB and ambulate  -f/u Rpt CBC   -encourage insentive spirometer use  -Encourage breastfeeding   -2units PRBC ordered and consented pt agrees  -d/w dr Dunlap
A/P: POD #1 s/p stat rpt c/s and btl @35.2wks; with acute on blood loss anemia s/p 2units prbcs  iron po   --Pain management as needed  -Advance as per protocol  -OOB and ambulate  -f/u Rpt CBC   -DC tellez f/u void  -Advance diet with flatus  -Encourage breastfeeding   -d/w dr. marlene rios

## 2022-05-18 NOTE — CHART NOTE - NSCHARTNOTEFT_GEN_A_CORE
Called by nurse  Upon preparing for discharge, patient went to use restroom before leaving and felt dizzy and pre-syncopal, pulled the call bell for help.  Upon nurse arrival, pt was alert and oriented, in NAD, deniies LOC, nausea/vomiting, sob, palpitations or any other acute issues.  Was escorted back to bed in wheelchair and stabilized    Vitals taken at that time stable  T(C): 37.1 (18 May 2022 16:35), Max: 37.2 (17 May 2022 23:45)  T(F): 98.7 (18 May 2022 16:35), Max: 99 (17 May 2022 23:45)  HR: 84 (18 May 2022 16:35) (84 - 98)  BP: 125/85 (18 May 2022 16:35) (102/62 - 125/85)  RR: 16 (18 May 2022 16:35) (16 - 18)  SpO2: 98% (18 May 2022 16:35) (98% - 98%)    Chest: CTA bilaterally, no W/R/R  Cardiac: RRR  Abd: soft; NT; no guarding or rebound; +BS x4 quad; Fundus firm NT;  incision C/D/I   Gyn: minimal  Ext: soft, NT; DTRs 2+ bilaterally; no worsening edema    A/P:  26y s/p rpt C/S and BTL complicated by complete placenta previa and acute blood loss anemia POD#3, s/p blood transfusion  suspected vasovagal episode, resolved    continue monitoring and rpt cbc  will reassess once resulted with Dr. Aguilar

## 2022-05-19 ENCOUNTER — EMERGENCY (EMERGENCY)
Facility: HOSPITAL | Age: 27
LOS: 1 days | Discharge: HOME | End: 2022-05-19
Attending: STUDENT IN AN ORGANIZED HEALTH CARE EDUCATION/TRAINING PROGRAM
Payer: MEDICAID

## 2022-05-19 VITALS
TEMPERATURE: 98 F | OXYGEN SATURATION: 99 % | HEIGHT: 66 IN | RESPIRATION RATE: 16 BRPM | SYSTOLIC BLOOD PRESSURE: 121 MMHG | DIASTOLIC BLOOD PRESSURE: 76 MMHG | HEART RATE: 74 BPM | WEIGHT: 177.91 LBS

## 2022-05-19 VITALS
RESPIRATION RATE: 17 BRPM | SYSTOLIC BLOOD PRESSURE: 122 MMHG | OXYGEN SATURATION: 98 % | TEMPERATURE: 98 F | HEART RATE: 80 BPM | DIASTOLIC BLOOD PRESSURE: 79 MMHG

## 2022-05-19 LAB
ALBUMIN SERPL ELPH-MCNC: 2.2 G/DL — LOW (ref 3.5–5)
ALP SERPL-CCNC: 63 U/L — SIGNIFICANT CHANGE UP (ref 40–120)
ALT FLD-CCNC: 15 U/L DA — SIGNIFICANT CHANGE UP (ref 10–60)
ANION GAP SERPL CALC-SCNC: 8 MMOL/L — SIGNIFICANT CHANGE UP (ref 5–17)
APTT BLD: 28.8 SEC — SIGNIFICANT CHANGE UP (ref 27.5–35.5)
AST SERPL-CCNC: 26 U/L — SIGNIFICANT CHANGE UP (ref 10–40)
BASOPHILS # BLD AUTO: 0.01 K/UL — SIGNIFICANT CHANGE UP (ref 0–0.2)
BASOPHILS NFR BLD AUTO: 0.1 % — SIGNIFICANT CHANGE UP (ref 0–2)
BILIRUB SERPL-MCNC: 0.6 MG/DL — SIGNIFICANT CHANGE UP (ref 0.2–1.2)
BUN SERPL-MCNC: 12 MG/DL — SIGNIFICANT CHANGE UP (ref 7–18)
CALCIUM SERPL-MCNC: 8.4 MG/DL — SIGNIFICANT CHANGE UP (ref 8.4–10.5)
CHLORIDE SERPL-SCNC: 110 MMOL/L — HIGH (ref 96–108)
CO2 SERPL-SCNC: 24 MMOL/L — SIGNIFICANT CHANGE UP (ref 22–31)
CREAT SERPL-MCNC: 0.77 MG/DL — SIGNIFICANT CHANGE UP (ref 0.5–1.3)
EGFR: 108 ML/MIN/1.73M2 — SIGNIFICANT CHANGE UP
EOSINOPHIL # BLD AUTO: 0.09 K/UL — SIGNIFICANT CHANGE UP (ref 0–0.5)
EOSINOPHIL NFR BLD AUTO: 0.9 % — SIGNIFICANT CHANGE UP (ref 0–6)
GLUCOSE SERPL-MCNC: 88 MG/DL — SIGNIFICANT CHANGE UP (ref 70–99)
HCG SERPL-ACNC: 2078 MIU/ML — HIGH
HCT VFR BLD CALC: 23.8 % — LOW (ref 34.5–45)
HGB BLD-MCNC: 7.9 G/DL — LOW (ref 11.5–15.5)
IMM GRANULOCYTES NFR BLD AUTO: 1.3 % — SIGNIFICANT CHANGE UP (ref 0–1.5)
INR BLD: 0.97 RATIO — SIGNIFICANT CHANGE UP (ref 0.88–1.16)
LYMPHOCYTES # BLD AUTO: 1.12 K/UL — SIGNIFICANT CHANGE UP (ref 1–3.3)
LYMPHOCYTES # BLD AUTO: 11.3 % — LOW (ref 13–44)
MCHC RBC-ENTMCNC: 25.6 PG — LOW (ref 27–34)
MCHC RBC-ENTMCNC: 33.2 GM/DL — SIGNIFICANT CHANGE UP (ref 32–36)
MCV RBC AUTO: 77.3 FL — LOW (ref 80–100)
MONOCYTES # BLD AUTO: 0.94 K/UL — HIGH (ref 0–0.9)
MONOCYTES NFR BLD AUTO: 9.4 % — SIGNIFICANT CHANGE UP (ref 2–14)
NEUTROPHILS # BLD AUTO: 7.66 K/UL — HIGH (ref 1.8–7.4)
NEUTROPHILS NFR BLD AUTO: 77 % — SIGNIFICANT CHANGE UP (ref 43–77)
NRBC # BLD: 0 /100 WBCS — SIGNIFICANT CHANGE UP (ref 0–0)
NT-PROBNP SERPL-SCNC: 184 PG/ML — HIGH (ref 0–125)
PLATELET # BLD AUTO: 225 K/UL — SIGNIFICANT CHANGE UP (ref 150–400)
POTASSIUM SERPL-MCNC: 3.7 MMOL/L — SIGNIFICANT CHANGE UP (ref 3.5–5.3)
POTASSIUM SERPL-SCNC: 3.7 MMOL/L — SIGNIFICANT CHANGE UP (ref 3.5–5.3)
PROT SERPL-MCNC: 5.9 G/DL — LOW (ref 6–8.3)
PROTHROM AB SERPL-ACNC: 11.5 SEC — SIGNIFICANT CHANGE UP (ref 10.5–13.4)
RBC # BLD: 3.08 M/UL — LOW (ref 3.8–5.2)
RBC # FLD: 17.3 % — HIGH (ref 10.3–14.5)
SODIUM SERPL-SCNC: 142 MMOL/L — SIGNIFICANT CHANGE UP (ref 135–145)
TROPONIN I, HIGH SENSITIVITY RESULT: 20.8 NG/L — SIGNIFICANT CHANGE UP
WBC # BLD: 9.95 K/UL — SIGNIFICANT CHANGE UP (ref 3.8–10.5)
WBC # FLD AUTO: 9.95 K/UL — SIGNIFICANT CHANGE UP (ref 3.8–10.5)

## 2022-05-19 PROCEDURE — 96374 THER/PROPH/DIAG INJ IV PUSH: CPT | Mod: XU

## 2022-05-19 PROCEDURE — 84702 CHORIONIC GONADOTROPIN TEST: CPT

## 2022-05-19 PROCEDURE — 71275 CT ANGIOGRAPHY CHEST: CPT | Mod: MA

## 2022-05-19 PROCEDURE — 85610 PROTHROMBIN TIME: CPT

## 2022-05-19 PROCEDURE — 99284 EMERGENCY DEPT VISIT MOD MDM: CPT

## 2022-05-19 PROCEDURE — 85730 THROMBOPLASTIN TIME PARTIAL: CPT

## 2022-05-19 PROCEDURE — 71045 X-RAY EXAM CHEST 1 VIEW: CPT

## 2022-05-19 PROCEDURE — 85025 COMPLETE CBC W/AUTO DIFF WBC: CPT

## 2022-05-19 PROCEDURE — 80053 COMPREHEN METABOLIC PANEL: CPT

## 2022-05-19 PROCEDURE — 71275 CT ANGIOGRAPHY CHEST: CPT | Mod: 26,MA

## 2022-05-19 PROCEDURE — L9981: CPT

## 2022-05-19 PROCEDURE — 71045 X-RAY EXAM CHEST 1 VIEW: CPT | Mod: 26

## 2022-05-19 PROCEDURE — 36415 COLL VENOUS BLD VENIPUNCTURE: CPT

## 2022-05-19 PROCEDURE — 93005 ELECTROCARDIOGRAM TRACING: CPT

## 2022-05-19 PROCEDURE — 83880 ASSAY OF NATRIURETIC PEPTIDE: CPT

## 2022-05-19 PROCEDURE — 93010 ELECTROCARDIOGRAM REPORT: CPT

## 2022-05-19 PROCEDURE — 99285 EMERGENCY DEPT VISIT HI MDM: CPT | Mod: 25

## 2022-05-19 PROCEDURE — 84484 ASSAY OF TROPONIN QUANT: CPT

## 2022-05-19 RX ORDER — SODIUM CHLORIDE 9 MG/ML
1000 INJECTION INTRAMUSCULAR; INTRAVENOUS; SUBCUTANEOUS ONCE
Refills: 0 | Status: COMPLETED | OUTPATIENT
Start: 2022-05-19 | End: 2022-05-19

## 2022-05-19 RX ORDER — KETOROLAC TROMETHAMINE 30 MG/ML
15 SYRINGE (ML) INJECTION ONCE
Refills: 0 | Status: DISCONTINUED | OUTPATIENT
Start: 2022-05-19 | End: 2022-05-19

## 2022-05-19 RX ORDER — SODIUM CHLORIDE 9 MG/ML
3 INJECTION INTRAMUSCULAR; INTRAVENOUS; SUBCUTANEOUS EVERY 8 HOURS
Refills: 0 | Status: DISCONTINUED | OUTPATIENT
Start: 2022-05-19 | End: 2022-05-22

## 2022-05-19 RX ADMIN — SODIUM CHLORIDE 1000 MILLILITER(S): 9 INJECTION INTRAMUSCULAR; INTRAVENOUS; SUBCUTANEOUS at 04:02

## 2022-05-19 RX ADMIN — Medication 15 MILLIGRAM(S): at 04:00

## 2022-05-19 RX ADMIN — Medication 15 MILLIGRAM(S): at 04:02

## 2022-05-19 RX ADMIN — SODIUM CHLORIDE 3 MILLILITER(S): 9 INJECTION INTRAMUSCULAR; INTRAVENOUS; SUBCUTANEOUS at 05:08

## 2022-05-19 NOTE — ED ADULT TRIAGE NOTE - CHIEF COMPLAINT QUOTE
biba from home with c/o on and off sob and chest pain , s/p caesarian section done on 5/15 as per ems and spouse

## 2022-05-19 NOTE — ED PROVIDER NOTE - NSFOLLOWUPINSTRUCTIONS_ED_ALL_ED_FT
You were seen in the emergency room today for chest pain. Please call your primary doctor to inform them of this ER visit and obtain the next available appointment within the next 3 days. As we discussed, return to the ER if you have any worsening symptoms.    We no longer feel that you need further emergency care or admission to the hospital at this time.    While we have determined that you are currently stable for discharge, we know that things can change. Please seek immediate medical attention or return to the ER if you experience any of the following:  Any worsening or persistent symptoms  Severe Pain  Chest Pain  Difficulty Breathing  Bleeding  Passing Out  Severe Rash  Inability to Eat or Drink  Persistent Fever    Please see a primary care doctor or specialist within 3 days to ensure that you are improving.    Please call the [facility] phone numbers on this document if you have any problems obtaining a follow up appointment.    I wish you well! -Dr Reed

## 2022-05-19 NOTE — ED PROVIDER NOTE - PATIENT PORTAL LINK FT
You can access the FollowMyHealth Patient Portal offered by [facility] by registering at the following website: [email or web address]. By joining [employer]’s FollowMyHealth portal, you will also be able to view your health information using other applications (apps) compatible with our system. self-care work/self-care/community/leisure/home management

## 2022-05-19 NOTE — ED ADULT NURSE NOTE - NS ED NURSE RECORD ANOTHER VITAL SIGN
Effort normal. No stridor. No respiratory distress. She has no wheezes. She has no rales. She exhibits no tenderness. Abdominal: Soft. She exhibits no distension. There is no tenderness. Musculoskeletal: Normal range of motion. Neurological: She is alert and oriented to person, place, and time. Skin: Skin is warm and dry. Psychiatric: She has a normal mood and affect.        DIAGNOSTIC RESULTS   LABS:    Labs Reviewed   COMPREHENSIVE METABOLIC PANEL W/ REFLEX TO MG FOR LOW K - Abnormal; Notable for the following components:       Result Value    Sodium 133 (*)     Chloride 95 (*)     Glucose 117 (*)     Calcium 10.9 (*)     All other components within normal limits    Narrative:     Performed at:  [facility] [address],  [place]   Phone [phone]   URINE RT REFLEX TO CULTURE - Abnormal; Notable for the following components:    Glucose, Ur 250 (*)     Ketones, Urine 15 (*)     Blood, Urine SMALL (*)     Protein,  (*)     All other components within normal limits    Narrative:     Performed at:  [facility] [address],  [place]   Phone [phone]   MICROSCOPIC URINALYSIS - Abnormal; Notable for the following components:    RBC, UA 5-10 (*)     All other components within normal limits    Narrative:     Performed at:  [facility] [address],  [place]   Phone [phone]   CULTURE BLOOD #1   CULTURE BLOOD #2   CBC WITH AUTO DIFFERENTIAL    Narrative:     Performed at:  [facility] [address],  [employer] [place]   Phone [phone]   TROPONIN    Narrative:     Performed at:  [facility] [address],  [employer]   Phone [phone] PEPTIDE    Narrative:     Performed at:  [facility]  [address],  [place]   Phone [phone]   PROTIME-INR    Narrative:     Performed at:  [facility] - [facility] [address],  [place] [facility]   Phone [phone]   LACTIC ACID, PLASMA    Narrative:     Performed at:  [facility] [address],  [place] [facility]   Phone [phone]   TROPONIN    Narrative:     Performed at:  [facility]) - [facility] [address],  [place] [facility]   Phone [phone]       All other labs werewithin normal range or not returned as of this dictation. EKG: All EKG's are interpreted by the Emergency Department Physician who either signs or Co-signs this chart in the absence of acardiologist.  Please see their note for interpretation of EKG. RADIOLOGY:     CT chest pulmonary embolism with contrast interpreted by radiologist for  FINDINGS:  Pulmonary Arteries: Pulmonary arteries are adequately opacified for  evaluation.  No evidence of intraluminal filling defect to suggest pulmonary  embolism.  Main pulmonary artery is normal in caliber. Mediastinum: No evidence of mediastinal lymphadenopathy.  The heart and  pericardium demonstrate no acute abnormality.  There is no acute abnormality  of the thoracic aorta.  Aortic calcifications are noted.  Small calcified  hilar lymph nodes are present. Lungs/pleura: Central airways are patent.  The lungs are clear with exception  stable lingular scarring. Upper Abdomen: Limited images of the upper abdomen show moderate hiatal  hernia.  There is 1.3 cm unchanged splenic arterial aneurysm. Soft Tissues/Bones: No acute bony abnormality. Interpretation per the Radiologist below, if available at the time of this note:    CT Chest Pulmonary Embolism W Contrast   Final Result   No evidence of pulmonary embolism or acute pulmonary abnormality. Moderate hiatal hernia.            No results Yes

## 2022-05-19 NOTE — ED PROVIDER NOTE - OBJECTIVE STATEMENT
25 yo F h/o cesarian 4 days ago, no h/o heart problems or clots p/w transient episode of chest pain and SOB and currently having chest pain only with deep breaths with no other associated symptoms. Pt denies recent fever or infectious symptoms, cough, N/V/ diarrhea, dysuria or frequency/hesitancy, focal numbness/weakness, syncope, other recent illness or hospitalizations.

## 2022-05-19 NOTE — ED PROVIDER NOTE - NSFOLLOWUPCLINICS_GEN_ALL_ED_FT
[facility]  [address]  [address] [place] [address]  Phone: [phone]  Fax: [phone]  Follow Up Time: 1-3 Days

## 2022-05-19 NOTE — ED PROVIDER NOTE - NS ED ROS FT
Patient Reports No  Fever/Chills  Nausea/Vomiting/Diarrhea  Urinary Symptoms  Syncope, Focal Numbness or Weakness  Other Recent Illness or Hospitalizations

## 2022-05-19 NOTE — ED PROVIDER NOTE - CLINICAL SUMMARY MEDICAL DECISION MAKING FREE TEXT BOX
Pt p/w transient episode of chest pain and SOB 4 days s/p cesarian. Labs showing H/H at baseline, HCG elevated as expected, neg trop with nml EKG. CTA showing no PE.    On reassessment pt states that she is feeling well with no current pain or SOB. Rec OB/GYN f/u within 3 days. Most likely non emergent etiology of symptoms- the details of the case, history, and exam make more emergent diagnoses much less likely. Discussed with pt my clinical impression and results, patient given strict return precautions if persistent or worsening of symptoms occurs, and need for close follow up. Pt expressed understanding and agrees with plan. Pt is well appearing with a reassuring exam. Discharge home with PMD or Specialist f/u within 3 days.

## 2022-05-23 PROBLEM — Z00.00 ENCOUNTER FOR PREVENTIVE HEALTH EXAMINATION: Status: ACTIVE | Noted: 2022-05-23

## 2022-05-24 ENCOUNTER — APPOINTMENT (OUTPATIENT)
Dept: OBGYN | Facility: CLINIC | Age: 27
End: 2022-05-24

## 2022-05-24 ENCOUNTER — OUTPATIENT (OUTPATIENT)
Dept: OUTPATIENT SERVICES | Facility: HOSPITAL | Age: 27
LOS: 1 days | End: 2022-05-24
Payer: MEDICAID

## 2022-05-24 ENCOUNTER — APPOINTMENT (OUTPATIENT)
Dept: OBGYN | Facility: CLINIC | Age: 27
End: 2022-05-24
Payer: MEDICAID

## 2022-05-24 VITALS
DIASTOLIC BLOOD PRESSURE: 77 MMHG | OXYGEN SATURATION: 99 % | HEART RATE: 83 BPM | SYSTOLIC BLOOD PRESSURE: 121 MMHG | BODY MASS INDEX: 26.52 KG/M2 | HEIGHT: 66 IN | TEMPERATURE: 97.7 F | WEIGHT: 165 LBS

## 2022-05-24 DIAGNOSIS — O44.02 COMPLETE PLACENTA PREVIA NOS OR WITHOUT HEMORRHAGE, SECOND TRIMESTER: ICD-10-CM

## 2022-05-24 DIAGNOSIS — Z86.2 PERSONAL HISTORY OF DISEASES OF THE BLOOD AND BLOOD-FORMING ORGANS AND CERTAIN DISORDERS INVOLVING THE IMMUNE MECHANISM: ICD-10-CM

## 2022-05-24 DIAGNOSIS — Z34.00 ENCOUNTER FOR SUPERVISION OF NORMAL FIRST PREGNANCY, UNSPECIFIED TRIMESTER: ICD-10-CM

## 2022-05-24 PROCEDURE — 80053 COMPREHEN METABOLIC PANEL: CPT

## 2022-05-24 PROCEDURE — 81003 URINALYSIS AUTO W/O SCOPE: CPT

## 2022-05-24 PROCEDURE — 85025 COMPLETE CBC W/AUTO DIFF WBC: CPT

## 2022-05-24 PROCEDURE — 87086 URINE CULTURE/COLONY COUNT: CPT

## 2022-05-24 PROCEDURE — G0463: CPT

## 2022-05-24 PROCEDURE — 36415 COLL VENOUS BLD VENIPUNCTURE: CPT | Mod: NC

## 2022-05-24 PROCEDURE — 99204 OFFICE O/P NEW MOD 45 MIN: CPT | Mod: 25

## 2022-05-24 RX ORDER — PRENATAL VIT NO.130/IRON/FOLIC 27MG-0.8MG
28-0.8 TABLET ORAL
Qty: 30 | Refills: 11 | Status: ACTIVE | COMMUNITY
Start: 2022-05-24 | End: 1900-01-01

## 2022-05-24 RX ORDER — IBUPROFEN 600 MG/1
600 TABLET, FILM COATED ORAL
Refills: 0 | Status: COMPLETED | COMMUNITY

## 2022-05-24 RX ORDER — IRON/IRON ASP GLY/FA/MV-MIN 38 125-25-1MG
TABLET ORAL
Refills: 0 | Status: COMPLETED | COMMUNITY

## 2022-05-24 RX ORDER — PNV/FERROUS SULFATE/FOLIC ACID 27-<0.5MG
TABLET ORAL
Refills: 0 | Status: COMPLETED | COMMUNITY

## 2022-05-24 NOTE — HISTORY OF PRESENT ILLNESS
[Clean/Dry/Intact] : clean, dry and intact [Healed] : healed [Mild] : mild vaginal bleeding [Doing Well] : is doing well [No Sign of Infection] : is showing no signs of infection [Sutures Removed] : sutures were removed [No [place]] : to avoid sexual intercourse [No Tampons/Suppositories] : against using tampons or vaginal suppositories [Limited Work] : to work with limitations [Limited Housework] : to do housework with limitations [Fever] : no fever [Chills] : no chills [Nausea] : no nausea [Vomiting] : no vomiting [Diarrhea] : no diarrhea [Vaginal Bleeding] : no vaginal bleeding [Pelvic Pressure] : no pelvic pressure [Dysuria] : no dysuria [Vaginal Discharge] : no vaginal discharge [Constipation] : no constipation [Erythema] : not erythematous [Swelling] : not swollen [Dehiscence] : not dehisced [Discharge] : absent of discharge [de-identified] : Complaining of Urinary symptoms / Baby girl @ 35.4 wks, was transferred to Level[facility] due to anatomic defect noted postpartum, going to have surgery on (05/25/22) / Patient is feeling moderate pain at incision site.  [de-identified] : Post Op Incision Check / Anemia / Urinary symptoms [de-identified] : Urine Cx sent today / Post op pain management with abdominal binder reviewed / CBC/CMP sent today / PNV daily / RTO 5 weeks for postpartum exam

## 2022-05-27 LAB
ALBUMIN SERPL ELPH-MCNC: 3.8 G/DL
ALP BLD-CCNC: 80 U/L
ALT SERPL-CCNC: 12 U/L
ANION GAP SERPL CALC-SCNC: 15 MMOL/L
AST SERPL-CCNC: 29 U/L
BACTERIA UR CULT: NORMAL
BASOPHILS # BLD AUTO: 0.04 K/UL
BASOPHILS NFR BLD AUTO: 0.5 %
BILIRUB SERPL-MCNC: 0.9 MG/DL
BUN SERPL-MCNC: 15 MG/DL
CALCIUM SERPL-MCNC: 9.4 MG/DL
CHLORIDE SERPL-SCNC: 107 MMOL/L
CO2 SERPL-SCNC: 23 MMOL/L
CREAT SERPL-MCNC: 0.62 MG/DL
EGFR: 124 ML/MIN/1.73M2
EOSINOPHIL # BLD AUTO: 0.15 K/UL
EOSINOPHIL NFR BLD AUTO: 1.7 %
GLUCOSE SERPL-MCNC: 86 MG/DL
HCT VFR BLD CALC: 33.9 %
HGB BLD-MCNC: 10 G/DL
IMM GRANULOCYTES NFR BLD AUTO: 3.4 %
LYMPHOCYTES # BLD AUTO: 1.72 K/UL
LYMPHOCYTES NFR BLD AUTO: 20 %
MAN DIFF?: NORMAL
MCHC RBC-ENTMCNC: 24.6 PG
MCHC RBC-ENTMCNC: 29.5 GM/DL
MCV RBC AUTO: 83.3 FL
MONOCYTES # BLD AUTO: 0.77 K/UL
MONOCYTES NFR BLD AUTO: 8.9 %
NEUTROPHILS # BLD AUTO: 5.65 K/UL
NEUTROPHILS NFR BLD AUTO: 65.5 %
PLATELET # BLD AUTO: 427 K/UL
POTASSIUM SERPL-SCNC: 4.5 MMOL/L
PROT SERPL-MCNC: 6.8 G/DL
RBC # BLD: 4.07 M/UL
RBC # FLD: 16.9 %
SODIUM SERPL-SCNC: 145 MMOL/L
WBC # FLD AUTO: 8.62 K/UL

## 2022-06-04 ENCOUNTER — INPATIENT (INPATIENT)
Facility: HOSPITAL | Age: 27
LOS: 1 days | Discharge: HOME | End: 2022-06-06
Attending: SPECIALIST | Admitting: SPECIALIST
Payer: MEDICAID

## 2022-06-04 VITALS
SYSTOLIC BLOOD PRESSURE: 112 MMHG | TEMPERATURE: 98 F | DIASTOLIC BLOOD PRESSURE: 72 MMHG | HEART RATE: 95 BPM | RESPIRATION RATE: 16 BRPM | OXYGEN SATURATION: 98 % | HEIGHT: 66 IN

## 2022-06-04 DIAGNOSIS — Z98.891 HISTORY OF UTERINE SCAR FROM PREVIOUS SURGERY: Chronic | ICD-10-CM

## 2022-06-04 DIAGNOSIS — N71.9 INFLAMMATORY DISEASE OF UTERUS, UNSPECIFIED: ICD-10-CM

## 2022-06-04 LAB
ALBUMIN SERPL ELPH-MCNC: 3.7 G/DL — SIGNIFICANT CHANGE UP (ref 3.3–5)
ALP SERPL-CCNC: 78 U/L — SIGNIFICANT CHANGE UP (ref 40–120)
ALT FLD-CCNC: 9 U/L — SIGNIFICANT CHANGE UP (ref 4–33)
ANION GAP SERPL CALC-SCNC: 12 MMOL/L — SIGNIFICANT CHANGE UP (ref 7–14)
APPEARANCE UR: CLEAR — SIGNIFICANT CHANGE UP
APTT BLD: 29.6 SEC — SIGNIFICANT CHANGE UP (ref 27–36.3)
AST SERPL-CCNC: 13 U/L — SIGNIFICANT CHANGE UP (ref 4–32)
BASOPHILS # BLD AUTO: 0.02 K/UL — SIGNIFICANT CHANGE UP (ref 0–0.2)
BASOPHILS NFR BLD AUTO: 0.3 % — SIGNIFICANT CHANGE UP (ref 0–2)
BILIRUB SERPL-MCNC: 0.7 MG/DL — SIGNIFICANT CHANGE UP (ref 0.2–1.2)
BILIRUB UR-MCNC: NEGATIVE — SIGNIFICANT CHANGE UP
BLD GP AB SCN SERPL QL: NEGATIVE — SIGNIFICANT CHANGE UP
BLOOD GAS VENOUS COMPREHENSIVE RESULT: SIGNIFICANT CHANGE UP
BUN SERPL-MCNC: 6 MG/DL — LOW (ref 7–23)
CALCIUM SERPL-MCNC: 9.1 MG/DL — SIGNIFICANT CHANGE UP (ref 8.4–10.5)
CHLORIDE SERPL-SCNC: 101 MMOL/L — SIGNIFICANT CHANGE UP (ref 98–107)
CO2 SERPL-SCNC: 23 MMOL/L — SIGNIFICANT CHANGE UP (ref 22–31)
COLOR SPEC: YELLOW — SIGNIFICANT CHANGE UP
COVID-19 SPIKE DOMAIN AB INTERP: POSITIVE
COVID-19 SPIKE DOMAIN ANTIBODY RESULT: >250 U/ML — HIGH
CREAT SERPL-MCNC: 0.63 MG/DL — SIGNIFICANT CHANGE UP (ref 0.5–1.3)
DIFF PNL FLD: ABNORMAL
EGFR: 123 ML/MIN/1.73M2 — SIGNIFICANT CHANGE UP
EOSINOPHIL # BLD AUTO: 0.08 K/UL — SIGNIFICANT CHANGE UP (ref 0–0.5)
EOSINOPHIL NFR BLD AUTO: 1.3 % — SIGNIFICANT CHANGE UP (ref 0–6)
FLUAV AG NPH QL: SIGNIFICANT CHANGE UP
FLUBV AG NPH QL: SIGNIFICANT CHANGE UP
GLUCOSE SERPL-MCNC: 91 MG/DL — SIGNIFICANT CHANGE UP (ref 70–99)
GLUCOSE UR QL: NEGATIVE — SIGNIFICANT CHANGE UP
HCT VFR BLD CALC: 35.4 % — SIGNIFICANT CHANGE UP (ref 34.5–45)
HGB BLD-MCNC: 10.8 G/DL — LOW (ref 11.5–15.5)
IANC: 4.34 K/UL — SIGNIFICANT CHANGE UP (ref 1.8–7.4)
IMM GRANULOCYTES NFR BLD AUTO: 0.6 % — SIGNIFICANT CHANGE UP (ref 0–1.5)
INR BLD: 1.2 RATIO — HIGH (ref 0.88–1.16)
KETONES UR-MCNC: NEGATIVE — SIGNIFICANT CHANGE UP
LEUKOCYTE ESTERASE UR-ACNC: NEGATIVE — SIGNIFICANT CHANGE UP
LIDOCAIN IGE QN: 24 U/L — SIGNIFICANT CHANGE UP (ref 7–60)
LYMPHOCYTES # BLD AUTO: 1.27 K/UL — SIGNIFICANT CHANGE UP (ref 1–3.3)
LYMPHOCYTES # BLD AUTO: 20.3 % — SIGNIFICANT CHANGE UP (ref 13–44)
MCHC RBC-ENTMCNC: 23.7 PG — LOW (ref 27–34)
MCHC RBC-ENTMCNC: 30.5 GM/DL — LOW (ref 32–36)
MCV RBC AUTO: 77.6 FL — LOW (ref 80–100)
MONOCYTES # BLD AUTO: 0.51 K/UL — SIGNIFICANT CHANGE UP (ref 0–0.9)
MONOCYTES NFR BLD AUTO: 8.1 % — SIGNIFICANT CHANGE UP (ref 2–14)
NEUTROPHILS # BLD AUTO: 4.34 K/UL — SIGNIFICANT CHANGE UP (ref 1.8–7.4)
NEUTROPHILS NFR BLD AUTO: 69.4 % — SIGNIFICANT CHANGE UP (ref 43–77)
NITRITE UR-MCNC: NEGATIVE — SIGNIFICANT CHANGE UP
NRBC # BLD: 0 /100 WBCS — SIGNIFICANT CHANGE UP
NRBC # FLD: 0 K/UL — SIGNIFICANT CHANGE UP
PH UR: 6.5 — SIGNIFICANT CHANGE UP (ref 5–8)
PLATELET # BLD AUTO: 381 K/UL — SIGNIFICANT CHANGE UP (ref 150–400)
POTASSIUM SERPL-MCNC: 4.1 MMOL/L — SIGNIFICANT CHANGE UP (ref 3.5–5.3)
POTASSIUM SERPL-SCNC: 4.1 MMOL/L — SIGNIFICANT CHANGE UP (ref 3.5–5.3)
PROT SERPL-MCNC: 7.3 G/DL — SIGNIFICANT CHANGE UP (ref 6–8.3)
PROT UR-MCNC: ABNORMAL
PROTHROM AB SERPL-ACNC: 13.9 SEC — HIGH (ref 10.5–13.4)
RBC # BLD: 4.56 M/UL — SIGNIFICANT CHANGE UP (ref 3.8–5.2)
RBC # FLD: 14.6 % — HIGH (ref 10.3–14.5)
RH IG SCN BLD-IMP: POSITIVE — SIGNIFICANT CHANGE UP
RSV RNA NPH QL NAA+NON-PROBE: SIGNIFICANT CHANGE UP
SARS-COV-2 IGG+IGM SERPL QL IA: >250 U/ML — HIGH
SARS-COV-2 IGG+IGM SERPL QL IA: POSITIVE
SARS-COV-2 RNA SPEC QL NAA+PROBE: SIGNIFICANT CHANGE UP
SODIUM SERPL-SCNC: 136 MMOL/L — SIGNIFICANT CHANGE UP (ref 135–145)
SP GR SPEC: >1.05 (ref 1–1.05)
UROBILINOGEN FLD QL: SIGNIFICANT CHANGE UP
WBC # BLD: 6.26 K/UL — SIGNIFICANT CHANGE UP (ref 3.8–10.5)
WBC # FLD AUTO: 6.26 K/UL — SIGNIFICANT CHANGE UP (ref 3.8–10.5)

## 2022-06-04 PROCEDURE — 99285 EMERGENCY DEPT VISIT HI MDM: CPT

## 2022-06-04 PROCEDURE — 74177 CT ABD & PELVIS W/CONTRAST: CPT | Mod: 26,MA

## 2022-06-04 PROCEDURE — 76830 TRANSVAGINAL US NON-OB: CPT | Mod: 26

## 2022-06-04 RX ORDER — ACETAMINOPHEN 500 MG
975 TABLET ORAL
Refills: 0 | Status: DISCONTINUED | OUTPATIENT
Start: 2022-06-04 | End: 2022-06-06

## 2022-06-04 RX ORDER — TETANUS TOXOID, REDUCED DIPHTHERIA TOXOID AND ACELLULAR PERTUSSIS VACCINE, ADSORBED 5; 2.5; 8; 8; 2.5 [IU]/.5ML; [IU]/.5ML; UG/.5ML; UG/.5ML; UG/.5ML
0.5 SUSPENSION INTRAMUSCULAR ONCE
Refills: 0 | Status: DISCONTINUED | OUTPATIENT
Start: 2022-06-04 | End: 2022-06-06

## 2022-06-04 RX ORDER — PIPERACILLIN AND TAZOBACTAM 4; .5 G/20ML; G/20ML
3.38 INJECTION, POWDER, LYOPHILIZED, FOR SOLUTION INTRAVENOUS ONCE
Refills: 0 | Status: COMPLETED | OUTPATIENT
Start: 2022-06-04 | End: 2022-06-04

## 2022-06-04 RX ORDER — OXYCODONE HYDROCHLORIDE 5 MG/1
5 TABLET ORAL ONCE
Refills: 0 | Status: DISCONTINUED | OUTPATIENT
Start: 2022-06-04 | End: 2022-06-06

## 2022-06-04 RX ORDER — SODIUM CHLORIDE 9 MG/ML
1000 INJECTION, SOLUTION INTRAVENOUS
Refills: 0 | Status: DISCONTINUED | OUTPATIENT
Start: 2022-06-04 | End: 2022-06-06

## 2022-06-04 RX ORDER — VANCOMYCIN HCL 1 G
1000 VIAL (EA) INTRAVENOUS ONCE
Refills: 0 | Status: COMPLETED | OUTPATIENT
Start: 2022-06-04 | End: 2022-06-04

## 2022-06-04 RX ORDER — IBUPROFEN 200 MG
600 TABLET ORAL EVERY 6 HOURS
Refills: 0 | Status: DISCONTINUED | OUTPATIENT
Start: 2022-06-04 | End: 2022-06-06

## 2022-06-04 RX ORDER — FOLIC ACID 0.8 MG
1 TABLET ORAL DAILY
Refills: 0 | Status: DISCONTINUED | OUTPATIENT
Start: 2022-06-04 | End: 2022-06-06

## 2022-06-04 RX ORDER — ERTAPENEM SODIUM 1 G/1
1000 INJECTION, POWDER, LYOPHILIZED, FOR SOLUTION INTRAMUSCULAR; INTRAVENOUS EVERY 24 HOURS
Refills: 0 | Status: DISCONTINUED | OUTPATIENT
Start: 2022-06-04 | End: 2022-06-06

## 2022-06-04 RX ORDER — SIMETHICONE 80 MG/1
80 TABLET, CHEWABLE ORAL EVERY 4 HOURS
Refills: 0 | Status: DISCONTINUED | OUTPATIENT
Start: 2022-06-04 | End: 2022-06-06

## 2022-06-04 RX ORDER — SODIUM CHLORIDE 9 MG/ML
1000 INJECTION INTRAMUSCULAR; INTRAVENOUS; SUBCUTANEOUS ONCE
Refills: 0 | Status: COMPLETED | OUTPATIENT
Start: 2022-06-04 | End: 2022-06-04

## 2022-06-04 RX ORDER — DIPHENHYDRAMINE HCL 50 MG
25 CAPSULE ORAL EVERY 6 HOURS
Refills: 0 | Status: DISCONTINUED | OUTPATIENT
Start: 2022-06-04 | End: 2022-06-06

## 2022-06-04 RX ORDER — MAGNESIUM HYDROXIDE 400 MG/1
30 TABLET, CHEWABLE ORAL
Refills: 0 | Status: DISCONTINUED | OUTPATIENT
Start: 2022-06-04 | End: 2022-06-06

## 2022-06-04 RX ORDER — OXYCODONE HYDROCHLORIDE 5 MG/1
5 TABLET ORAL
Refills: 0 | Status: DISCONTINUED | OUTPATIENT
Start: 2022-06-04 | End: 2022-06-06

## 2022-06-04 RX ORDER — LANOLIN
1 OINTMENT (GRAM) TOPICAL EVERY 6 HOURS
Refills: 0 | Status: DISCONTINUED | OUTPATIENT
Start: 2022-06-04 | End: 2022-06-06

## 2022-06-04 RX ORDER — ACETAMINOPHEN 500 MG
1000 TABLET ORAL ONCE
Refills: 0 | Status: COMPLETED | OUTPATIENT
Start: 2022-06-04 | End: 2022-06-04

## 2022-06-04 RX ADMIN — Medication 1 MILLIGRAM(S): at 16:07

## 2022-06-04 RX ADMIN — PIPERACILLIN AND TAZOBACTAM 200 GRAM(S): 4; .5 INJECTION, POWDER, LYOPHILIZED, FOR SOLUTION INTRAVENOUS at 13:31

## 2022-06-04 RX ADMIN — Medication 400 MILLIGRAM(S): at 11:11

## 2022-06-04 RX ADMIN — Medication 250 MILLIGRAM(S): at 14:04

## 2022-06-04 RX ADMIN — Medication 975 MILLIGRAM(S): at 21:25

## 2022-06-04 RX ADMIN — ERTAPENEM SODIUM 120 MILLIGRAM(S): 1 INJECTION, POWDER, LYOPHILIZED, FOR SOLUTION INTRAMUSCULAR; INTRAVENOUS at 16:07

## 2022-06-04 RX ADMIN — SODIUM CHLORIDE 125 MILLILITER(S): 9 INJECTION, SOLUTION INTRAVENOUS at 21:25

## 2022-06-04 RX ADMIN — SODIUM CHLORIDE 1000 MILLILITER(S): 9 INJECTION INTRAMUSCULAR; INTRAVENOUS; SUBCUTANEOUS at 11:11

## 2022-06-04 NOTE — ED PROVIDER NOTE - NS ED ATTENDING STATEMENT MOD
This was a shared visit with the SHAHIDA. I reviewed and verified the documentation and independently performed the documented:

## 2022-06-04 NOTE — H&P ADULT - HISTORY OF PRESENT ILLNESS
26y/o  POD#20 s/p rLTCS + BTL 2/2 placenta previa presenting to the ED complaining of abdominal pain and diarrhea.     Patient reports abdominal pain that has worsened in severity and intensity over the past few days. She began having associated diarrhea 3 days ago, dysuria x1 day, and nausea without vomiting. She is s/p rLTCS on May 15th for placenta previa, after presenting to the hospital with heavy vaginal bleeding at 35w4d. She received 4uPRBC while inpatient and was discharged. At her postpartum visit she reported mild pain but since then her pain worsened. She denies lightheadedness, dizziness, HA, CP, palpitations, vomiting, vaginal bleeding or discharge.     OBHx:     - SAB x2  - pLTCS 2/2 arrest, FT ()  - rLTCS 2/2 placenta previa, received 2uPRBC intraop, and 2uPRBC postpartum  GYNHx: Denies fibroids, cysts, endometriosis, STI's, Abnormal pap smears   PMHx: Denies  PSHx: C/S x2  Meds: PNVs, Fe2+  Allergies: NKDA      Vital Signs Last 24 Hrs  T(C): 37.2 (2022 11:18), Max: 37.2 (2022 11:18)  T(F): 99 (2022 11:18), Max: 99 (2022 11:18)  HR: 95 (2022 10:08) (95 - 95)  BP: 112/72 (2022 10:08) (112/72 - 112/72)  BP(mean): --  RR: 16 (2022 10:08) (16 - 16)  SpO2: 98% (2022 10:08) (98% - 98%)    Physical Exam:   General: sitting comfortably in bed, NAD   CV: RRR  Lungs: CTAB  Abd: Soft, non-tender, non-distended.  Bowel sounds present.    Inc: Pfannenstiel c/d/i  Ext: non-tender b/l, no edema     LABS:             10.8   6.26  )-----------( 381      ( 2022 11:09 )             35.4     06-04  136  |  101  |  6<L>  ----------------------------<  91  4.1   |  23  |  0.63    Ca    9.1      2022 11:09  TPro  7.3  /  Alb  3.7  /  TBili  0.7  /  DBili  x   /  AST  13  /  ALT  9   /  AlkPhos  78  06-04  PT/INR - ( 2022 14:30 )   PT: 13.9 sec;   INR: 1.20 ratio    PTT - ( 2022 14:30 )  PTT:29.6 sec    Urinalysis Basic - ( 2022 13:31 )  Color: Yellow / Appearance: Clear / SG: >1.050 / pH: x  Gluc: x / Ketone: Negative  / Bili: Negative / Urobili: <2 mg/dL   Blood: x / Protein: 30 mg/dL / Nitrite: Negative   Leuk Esterase: Negative / RBC: 2 /HPF / WBC 1 /HPF   Sq Epi: x / Non Sq Epi: 2 /HPF / Bacteria: Negative      RADIOLOGY & ADDITIONAL STUDIES:    < from: US Transvaginal (22 @ 13:12) >  ACC: [id number] EXAM:  US TRANSVAGINAL                        PROCEDURE DATE:  2022    INTERPRETATION:  CLINICAL INFORMATION: Pelvic pain, recent .   Bladder flap hematoma and possible peritonitis with bowel injury noted on   CT  LMP: Not available  COMPARISON: None available.  TECHNIQUE:  Endovaginal pelvic sonogram only.  FINDINGS:  Uterus: 10.1 cm x 5.0 cm x 6.3 cm. Within normal limits.  Endometrium: 10 mm. Within normal limits.  Right ovary: 2.6 cm x 1.4 cm x2.6 cm. Within normal limits.  Left ovary: 3.1 cm x 1.6 cm x 2.9 cm. Within normal limits.  Fluid: Between the uterus and bladder is a complex collection,   corresponding with bladder flap hematoma noted on CT, measuring 8.5 x   10.1 x 3.9 cm. Hemorrhagic free fluid is present in the cul-de-sac.  IMPRESSION:  Anterior bladder flap hematoma and hemorrhagic free fluid in the   cul-de-sac as noted on CT.  --- End of Report ---  RODNEY CAMPBELL MD; Resident Radiologist  This documenthas been electronically signed.  ABHISHEK TRUJILLO MD; Attending Radiologist  This document has been electronically signed. 2022  1:29PM  < end of copied text >        < from: CT Abdomen and Pelvis w/ IV Cont (22 @ 11:53) >  ACC: [id number] EXAM:  CT ABDOMEN AND PELVIS IC                        PROCEDURE DATE:  2022    INTERPRETATION:  CLINICAL INFORMATION: Abdominal pain, recent   COMPARISON: None.  CONTRAST/COMPLICATIONS:  IV Contrast: Omnipaque 350  63 cc administered   7 cc discarded  Oral Contrast: NONE  Complications: None reported at time of study completion  PROCEDURE:  CT of the Abdomen and Pelvis was performed.  Sagittal and coronal reformats were performed.  FINDINGS:  LOWER CHEST: Within normal limits.  LIVER: Within normal limits.  BILE DUCTS: Normal caliber.  GALLBLADDER: Within normal limits.  SPLEEN: Within normal limits.  PANCREAS: Within normal limits.  ADRENALS: Within normal limits.  KIDNEYS/URETERS: Symmetric enhancement. No hydronephrosis.  BLADDER: Within normal limits.  REPRODUCTIVE ORGANS: Postpartum appearance of the uterus.  BOWEL: No bowel obstruction. Thickening of small bowel loops in the   pelvis may be reactive to an inflammatory process in the pelvis. A small   bowel injury is not excluded. Close follow-up is recommended.  PERITONEUM: A 6.5 x 3.1 x 8.6 cm collection containing heterogeneous high   density interposed between the bladder and uterus (602:82, 2:87)   consistent with a bladder flap hematoma. Additional haziness within the   peritoneum in the upper pelvis with thickening of the peritoneal lining   concerning for peritoneal inflammation.  VESSELS: Within normal limits.  RETROPERITONEUM/LYMPH NODES: No lymphadenopathy.  ABDOMINAL WALL: Postsurgical changes.  BONES: Within normal limits.  IMPRESSION:  An 8.6 cm bladder flap hematoma.  Peritoneal inflammation as above concerning for peritonitis.  Thickening of a small bowel loop in the pelvis may be reactive to   peritoneal inflammation. A bowel injury is not excluded. Close interval   follow-up is recommended.  Findings were discussed with Dr. Helen Bloch, ED attending, 2022   12:09 PM by Dr. Campbell with read back confirmation.  --- End of Report ---  RODNEY CAMPBELL MD; Resident Radiologist  This document has been electronically signed.  LYLE MULLIGAN MD; Attending Radiologist  This document has been electronically signed. 2022 12:20PM  < end of copied text >   26y/o  POD#20 s/p rLTCS + BTL 2/2 placenta previa presenting to the ED complaining of abdominal pain and diarrhea.     Patient reports abdominal pain that has worsened in severity and intensity over the past few days. She began having associated diarrhea 3 days ago, dysuria x1 day, and nausea without vomiting. She is s/p rLTCS on May 15th for placenta previa, after presenting to the hospital with heavy vaginal bleeding at 35w4d. She received 4uPRBC while inpatient and was discharged. At her postpartum visit she reported mild pain but since then her pain worsened. She denies lightheadedness, dizziness, HA, CP, palpitations, vomiting, vaginal bleeding or discharge.     OBHx:     - SAB x2  - pLTCS 2/2 arrest, FT ()  - rLTCS 2/2 placenta previa, received 2uPRBC intraop, and 2uPRBC postpartum  GYNHx: Denies fibroids, cysts, endometriosis, STI's, Abnormal pap smears   PMHx: Denies  PSHx: C/S x2  Meds: PNVs, Fe2+  Allergies: NKDA      Vital Signs Last 24 Hrs  T(C): 37.2 (2022 11:18), Max: 37.2 (2022 11:18)  T(F): 99 (2022 11:18), Max: 99 (2022 11:18)  HR: 95 (2022 10:08) (95 - 95)  BP: 112/72 (2022 10:08) (112/72 - 112/72)  BP(mean): --  RR: 16 (2022 10:08) (16 - 16)  SpO2: 98% (2022 10:08) (98% - 98%)    Physical Exam:   General: sitting comfortably in bed, NAD   CV: RRR  Lungs: CTAB  Abd: Soft, severe fundal tenderness, voluntary guarding, +BS  Inc: Pfannenstiel c/d/i  Ext: non-tender b/l, no edema     LABS:             10.8   6.26  )-----------( 381      ( 2022 11:09 )             35.4     06-04  136  |  101  |  6<L>  ----------------------------<  91  4.1   |  23  |  0.63    Ca    9.1      2022 11:09  TPro  7.3  /  Alb  3.7  /  TBili  0.7  /  DBili  x   /  AST  13  /  ALT  9   /  AlkPhos  78  06-04  PT/INR - ( 2022 14:30 )   PT: 13.9 sec;   INR: 1.20 ratio    PTT - ( 2022 14:30 )  PTT:29.6 sec    Urinalysis Basic - ( 2022 13:31 )  Color: Yellow / Appearance: Clear / SG: >1.050 / pH: x  Gluc: x / Ketone: Negative  / Bili: Negative / Urobili: <2 mg/dL   Blood: x / Protein: 30 mg/dL / Nitrite: Negative   Leuk Esterase: Negative / RBC: 2 /HPF / WBC 1 /HPF   Sq Epi: x / Non Sq Epi: 2 /HPF / Bacteria: Negative      RADIOLOGY & ADDITIONAL STUDIES:    < from: US Transvaginal (22 @ 13:12) >  ACC: [id number] EXAM:  US TRANSVAGINAL                        PROCEDURE DATE:  2022    INTERPRETATION:  CLINICAL INFORMATION: Pelvic pain, recent .   Bladder flap hematoma and possible peritonitis with bowel injury noted on   CT  LMP: Not available  COMPARISON: None available.  TECHNIQUE:  Endovaginal pelvic sonogram only.  FINDINGS:  Uterus: 10.1 cm x 5.0 cm x 6.3 cm. Within normal limits.  Endometrium: 10 mm. Within normal limits.  Right ovary: 2.6 cm x 1.4 cm x2.6 cm. Within normal limits.  Left ovary: 3.1 cm x 1.6 cm x 2.9 cm. Within normal limits.  Fluid: Between the uterus and bladder is a complex collection,   corresponding with bladder flap hematoma noted on CT, measuring 8.5 x   10.1 x 3.9 cm. Hemorrhagic free fluid is present in the cul-de-sac.  IMPRESSION:  Anterior bladder flap hematoma and hemorrhagic free fluid in the   cul-de-sac as noted on CT.  --- End of Report ---  RODNEY CAMPBELL MD; Resident Radiologist  This documenthas been electronically signed.  ABHISHEK TRUJILLO MD; Attending Radiologist  This document has been electronically signed. 2022  1:29PM  < end of copied text >        < from: CT Abdomen and Pelvis w/ IV Cont (22 @ 11:53) >  ACC: [id number] EXAM:  CT ABDOMEN AND PELVIS IC                        PROCEDURE DATE:  2022    INTERPRETATION:  CLINICAL INFORMATION: Abdominal pain, recent   COMPARISON: None.  CONTRAST/COMPLICATIONS:  IV Contrast: Omnipaque 350  63 cc administered   7 cc discarded  Oral Contrast: NONE  Complications: None reported at time of study completion  PROCEDURE:  CT of the Abdomen and Pelvis was performed.  Sagittal and coronal reformats were performed.  FINDINGS:  LOWER CHEST: Within normal limits.  LIVER: Within normal limits.  BILE DUCTS: Normal caliber.  GALLBLADDER: Within normal limits.  SPLEEN: Within normal limits.  PANCREAS: Within normal limits.  ADRENALS: Within normal limits.  KIDNEYS/URETERS: Symmetric enhancement. No hydronephrosis.  BLADDER: Within normal limits.  REPRODUCTIVE ORGANS: Postpartum appearance of the uterus.  BOWEL: No bowel obstruction. Thickening of small bowel loops in the   pelvis may be reactive to an inflammatory process in the pelvis. A small   bowel injury is not excluded. Close follow-up is recommended.  PERITONEUM: A 6.5 x 3.1 x 8.6 cm collection containing heterogeneous high   density interposed between the bladder and uterus (602:82, 2:87)   consistent with a bladder flap hematoma. Additional haziness within the   peritoneum in the upper pelvis with thickening of the peritoneal lining   concerning for peritoneal inflammation.  VESSELS: Within normal limits.  RETROPERITONEUM/LYMPH NODES: No lymphadenopathy.  ABDOMINAL WALL: Postsurgical changes.  BONES: Within normal limits.  IMPRESSION:  An 8.6 cm bladder flap hematoma.  Peritoneal inflammation as above concerning for peritonitis.  Thickening of a small bowel loop in the pelvis may be reactive to   peritoneal inflammation. A bowel injury is not excluded. Close interval   follow-up is recommended.  Findings were discussed with Dr. Helen Bloch, ED attending, 2022   12:09 PM by Dr. Campbell with read back confirmation.  --- End of Report ---  RODNEY CAMPBELL MD; Resident Radiologist  This document has been electronically signed.  LYLE MULLIGAN MD; Attending Radiologist  This document has been electronically signed. 2022 12:20PM  < end of copied text >

## 2022-06-04 NOTE — ED PROVIDER NOTE - CLINICAL SUMMARY MEDICAL DECISION MAKING FREE TEXT BOX
27yF s/p  on 5/15, no stated pmhx presenting with abdominal pain and diarrhea x 4 days. On exam pt is well appearing, afebrile, diffuse lower abd tenderness. Concern for endometritis, post op collection, viral illness. Plan: cbc/cmp/lipase, vbg, ua/ucx, CT abd/pelvis, TVUS, stool culture. Pain control, IV fluids.

## 2022-06-04 NOTE — ED PROVIDER NOTE - ATTENDING APP SHARED VISIT CONTRIBUTION OF CARE
Patient examined, in mild distress, HEENT nml heart s1s2, lungs clear abd soft diffusely tender, greatest in RLQ, pelvic chaperoned by RENETTA hurley,  very tender r adnexa, cervix nontender, no bleeding, extrem no edema. pulses intact.

## 2022-06-04 NOTE — ED ADULT TRIAGE NOTE - CHIEF COMPLAINT QUOTE
states" I am having abdominal pain since 4 days with diarrhea since 3 days" last diarrhea yesterday and pain is getting worst today" h/o C=section on May 15.

## 2022-06-04 NOTE — PATIENT PROFILE ADULT - FALL HARM RISK - UNIVERSAL INTERVENTIONS
Bed in lowest position, wheels locked, appropriate side rails in place/Call bell, personal items and telephone in reach/Instruct patient to call for assistance before getting out of bed or chair/Non-slip footwear when patient is out of bed/[place] to call system/Physically safe environment - no spills, clutter or unnecessary equipment/Purposeful Proactive Rounding/Room/bathroom lighting operational, light cord in reach

## 2022-06-04 NOTE — ED PROVIDER NOTE - PROGRESS NOTE DETAILS
RENETTA Mckeon: Radiology called, CT concerning for bladder wall hematoma, peritonitis, bowel thickening near bladder wall hematoma possible bowel injury. Recommending rpt CT scan in 12 hours with oral contrast. Discussed with attg, plan to start abx. Spoke with OBGYN resident, will see pt in the ED RENETTA Mckeon: Pt seen by OBGYN can admit to [facility] pt aware of admission

## 2022-06-04 NOTE — H&P ADULT - ASSESSMENT
28yo  POD#20 s/p rLTCS @35w4d 2/2 placental previa who presented to the ED with severe abdominal pain being admitted for treatment with IV antibiotics. Patient found to have 8x6cm bladder flap hematoma with small bowel and peritoneum inflammation. Patient currently in stable condition, VSS, afebrile, no leukocytosis, with severe fundal tenderness on exam.     #Abdominal pain  - Admit to postpartum service for mgmt   - Differential includes endometritis vs bladder hematoma infection, imaging done w/o PO abx so cannot rule out bowel injury at this time  - IV abx with Invanz (-), s/p Zosyn (22) in ED  - CTAP (22): An 8.6 cm bladder flap hematoma. Peritoneal inflammation as above concerning for peritonitis. Thickening of a small bowel loop in the pelvis may be reactive to peritoneal inflammation. A bowel injury is not excluded.  - TVUS (22): Anterior bladder flap hematoma and hemorrhagic free fluid in the cul-de-sac as noted on CT.  - Stool cultures sent () for diarrhea, f/u results  - Afebrile, no leukocytosis  - Pain mgmt with Tylenol/Motrin/Oxy    #Well Woman Care  - Reg diet  - HSQ and SCDs while in bed for DVT ppx  - Voiding spontaneously, monitor UOP  - Monitor VS, O2 sat  - Increase ambulation, encourage incentive spirometry use  - Breast pump to be placed bedside  - Con't PNVs and Fe2+    Seen w/ Dr. Sarah Cruz MD PGY2 26yo  POD#20 s/p rLTCS @35w4d 2/2 placental previa who presented to the ED with severe abdominal pain being admitted for treatment with IV antibiotics. Patient found to have 8x6cm bladder flap hematoma with small bowel and peritoneum inflammation. Patient currently in stable condition, VSS, afebrile, no leukocytosis, with severe fundal tenderness on exam.     #Abdominal pain  - Admit to postpartum service for mgmt   - Differential includes endometritis vs bladder hematoma infection, imaging done w/o PO abx so cannot rule out bowel injury at this time  - IV abx with Invanz (-), s/p Zosyn (22) in ED  - CTAP (22): An 8.6 cm bladder flap hematoma. Peritoneal inflammation as above concerning for peritonitis. Thickening of a small bowel loop in the pelvis may be reactive to peritoneal inflammation. A bowel injury is not excluded.       -- repeat CTAP with PO contrast, 12hours after inital CT (22 @[place])  - TVUS (22): Anterior bladder flap hematoma and hemorrhagic free fluid in the cul-de-sac as noted on CT.  - Stool cultures sent () for diarrhea, f/u results  - Afebrile, no leukocytosis  - Pain mgmt with Tylenol/Motrin/Oxy  - AM CBC/BMP/Coags    #Well Woman Care  - Reg diet  - HSQ and SCDs while in bed for DVT ppx  - Voiding spontaneously, monitor UOP  - Monitor VS, O2 sat  - Increase ambulation, encourage incentive spirometry use  - Breast pump to be placed bedside  - Con't PNVs and Fe2+    Seen w/ Dr. Sarah rCuz MD PGY2

## 2022-06-04 NOTE — ED ADULT NURSE NOTE - OBJECTIVE STATEMENT
A&Ox4, no significant past medical history, C/S on 5/15, presents to ED c/o epigastric pain that radiates to RLQ associated with diarrhea x 4 days. Respirations are even and unlabored, sating at 100% on RA, 20 G to L AC placed, rectal temp 99.0 F, labs sent, and medicated.

## 2022-06-04 NOTE — H&P ADULT - ATTENDING COMMENTS
Attending Note     POD# 20 rLTCS and BS c/w placental previa received 4 u PRBCS   Pt reports worsening adominal pain with diarrhea /nausea / vomiting  CT scan in ER shows large bladder flap hematoma and peritonitis, cannot r/o small bowel injury   Abd soft, difiusely tender ++ tenderness on uterus   wBC normal   Will admit for abx, repeat CT scan with po contrast, possible IR consult      JACKELINE Smith MD

## 2022-06-04 NOTE — ED ADULT NURSE NOTE - NSIMPLEMENTINTERV_GEN_ALL_ED
Implemented All Universal Safety Interventions:  Point to call system. Call bell, personal items and telephone within reach. Instruct patient to call for assistance. Room bathroom lighting operational. Non-slip footwear when patient is off stretcher. Physically safe environment: no spills, clutter or unnecessary equipment. Stretcher in lowest position, wheels locked, appropriate side rails in place.

## 2022-06-04 NOTE — ED PROVIDER NOTE - OBJECTIVE STATEMENT
27yF s/p  on 5/15, no stated pmhx presenting with abdominal pain and diarrhea x 4 days. Pt states for the past 4 days she has constant, severe, bilateral abdominal pain. Pain began in the mid abdomen and is now diffuse. Reports 2-3 episodes of diarrhea per day, non bloody no mucus. Associated she has cold sweats and chills. Since yesterday she has been having pain with urination. Denies fever, nausea, vomiting, back pain, urinary frequency, cp, sob, recent abx use, recent travel, known sick contacts or any other concerns.   ID [id number]

## 2022-06-05 LAB
ANION GAP SERPL CALC-SCNC: 9 MMOL/L — SIGNIFICANT CHANGE UP (ref 7–14)
APTT BLD: 29.1 SEC — SIGNIFICANT CHANGE UP (ref 27–36.3)
BASOPHILS # BLD AUTO: 0.03 K/UL — SIGNIFICANT CHANGE UP (ref 0–0.2)
BASOPHILS NFR BLD AUTO: 0.5 % — SIGNIFICANT CHANGE UP (ref 0–2)
BUN SERPL-MCNC: 5 MG/DL — LOW (ref 7–23)
CALCIUM SERPL-MCNC: 8.5 MG/DL — SIGNIFICANT CHANGE UP (ref 8.4–10.5)
CHLORIDE SERPL-SCNC: 105 MMOL/L — SIGNIFICANT CHANGE UP (ref 98–107)
CO2 SERPL-SCNC: 24 MMOL/L — SIGNIFICANT CHANGE UP (ref 22–31)
CREAT SERPL-MCNC: 0.56 MG/DL — SIGNIFICANT CHANGE UP (ref 0.5–1.3)
CULTURE RESULTS: SIGNIFICANT CHANGE UP
EGFR: 127 ML/MIN/1.73M2 — SIGNIFICANT CHANGE UP
EOSINOPHIL # BLD AUTO: 0.13 K/UL — SIGNIFICANT CHANGE UP (ref 0–0.5)
EOSINOPHIL NFR BLD AUTO: 2.3 % — SIGNIFICANT CHANGE UP (ref 0–6)
GLUCOSE SERPL-MCNC: 81 MG/DL — SIGNIFICANT CHANGE UP (ref 70–99)
HCT VFR BLD CALC: 32.2 % — LOW (ref 34.5–45)
HGB BLD-MCNC: 9.8 G/DL — LOW (ref 11.5–15.5)
IANC: 3.64 K/UL — SIGNIFICANT CHANGE UP (ref 1.8–7.4)
IMM GRANULOCYTES NFR BLD AUTO: 0.5 % — SIGNIFICANT CHANGE UP (ref 0–1.5)
INR BLD: 1.2 RATIO — HIGH (ref 0.88–1.16)
LYMPHOCYTES # BLD AUTO: 1.26 K/UL — SIGNIFICANT CHANGE UP (ref 1–3.3)
LYMPHOCYTES # BLD AUTO: 22.3 % — SIGNIFICANT CHANGE UP (ref 13–44)
MAGNESIUM SERPL-MCNC: 1.7 MG/DL — SIGNIFICANT CHANGE UP (ref 1.6–2.6)
MCHC RBC-ENTMCNC: 24.6 PG — LOW (ref 27–34)
MCHC RBC-ENTMCNC: 30.4 GM/DL — LOW (ref 32–36)
MCV RBC AUTO: 80.7 FL — SIGNIFICANT CHANGE UP (ref 80–100)
MONOCYTES # BLD AUTO: 0.55 K/UL — SIGNIFICANT CHANGE UP (ref 0–0.9)
MONOCYTES NFR BLD AUTO: 9.8 % — SIGNIFICANT CHANGE UP (ref 2–14)
NEUTROPHILS # BLD AUTO: 3.64 K/UL — SIGNIFICANT CHANGE UP (ref 1.8–7.4)
NEUTROPHILS NFR BLD AUTO: 64.6 % — SIGNIFICANT CHANGE UP (ref 43–77)
NRBC # BLD: 0 /100 WBCS — SIGNIFICANT CHANGE UP
NRBC # FLD: 0 K/UL — SIGNIFICANT CHANGE UP
PHOSPHATE SERPL-MCNC: 3.9 MG/DL — SIGNIFICANT CHANGE UP (ref 2.5–4.5)
PLATELET # BLD AUTO: 347 K/UL — SIGNIFICANT CHANGE UP (ref 150–400)
POTASSIUM SERPL-MCNC: 4 MMOL/L — SIGNIFICANT CHANGE UP (ref 3.5–5.3)
POTASSIUM SERPL-SCNC: 4 MMOL/L — SIGNIFICANT CHANGE UP (ref 3.5–5.3)
PROTHROM AB SERPL-ACNC: 13.9 SEC — HIGH (ref 10.5–13.4)
RBC # BLD: 3.99 M/UL — SIGNIFICANT CHANGE UP (ref 3.8–5.2)
RBC # FLD: 14.4 % — SIGNIFICANT CHANGE UP (ref 10.3–14.5)
SODIUM SERPL-SCNC: 138 MMOL/L — SIGNIFICANT CHANGE UP (ref 135–145)
SPECIMEN SOURCE: SIGNIFICANT CHANGE UP
WBC # BLD: 5.64 K/UL — SIGNIFICANT CHANGE UP (ref 3.8–10.5)
WBC # FLD AUTO: 5.64 K/UL — SIGNIFICANT CHANGE UP (ref 3.8–10.5)

## 2022-06-05 RX ADMIN — Medication 975 MILLIGRAM(S): at 17:06

## 2022-06-05 RX ADMIN — Medication 1 MILLIGRAM(S): at 11:44

## 2022-06-05 RX ADMIN — Medication 975 MILLIGRAM(S): at 23:22

## 2022-06-05 RX ADMIN — Medication 975 MILLIGRAM(S): at 09:46

## 2022-06-05 RX ADMIN — ERTAPENEM SODIUM 120 MILLIGRAM(S): 1 INJECTION, POWDER, LYOPHILIZED, FOR SOLUTION INTRAMUSCULAR; INTRAVENOUS at 17:14

## 2022-06-05 RX ADMIN — SODIUM CHLORIDE 125 MILLILITER(S): 9 INJECTION, SOLUTION INTRAVENOUS at 09:02

## 2022-06-05 RX ADMIN — Medication 975 MILLIGRAM(S): at 17:30

## 2022-06-05 RX ADMIN — Medication 975 MILLIGRAM(S): at 09:02

## 2022-06-05 RX ADMIN — Medication 1 TABLET(S): at 11:44

## 2022-06-05 NOTE — CONSULT NOTE ADULT - SUBJECTIVE AND OBJECTIVE BOX
Vascular & Interventional Radiology Brief Consult Note    HPI: 27y Female POD 11 from  presents with pain, found to have bladder flap hematoma. IR consulted for possible drainage.     Allergies:   Medications (Abx/Cardiac/Anticoagulation/Blood Products)  ertapenem  IVPB: 120 mL/Hr IV Intermittent ( @ 16:07)  piperacillin/tazobactam IVPB...: 200 mL/Hr IV Intermittent ( @ 13:31)  vancomycin  IVPB.: 250 mL/Hr IV Intermittent ( @ 14:04)    Data:    T(C): 36.7  HR: 81  BP: 113/67  RR: 18  SpO2: 100%    -WBC 5.64 / HgB 9.8 / Hct 32.2 / Plt 347  -Na 138 / Cl 105 / BUN 5 / Glucose 81  -K 4.0 / CO2 24 / Cr 0.56  -ALT -- / Alk Phos -- / T.Bili --  -INR1.20    Imaging: CT abd/pelvis with pelvic collection, likely hematoma due to internal hyperdense fluid (clot) without internal foci of air. Ultrasound shows multiloculated with extensive septations. Although collection is large, largest individual pocket is 1.5cm     Assessment/Plan:   27y Female with recent  presents with pelvic pain, found to have bladder flap hematoma. No leukocytosis or fever at this time. As per documentation, pain is under control.   Would defer drainage this time. Ultrasound confirms collection is extensively multiloculated which would not drain well due to isolated pockets of fluid. Drain placement would also risk superinfection of hematoma. If patient does not clinically improve, can consider aspiration of fluid however would otherwise limit any intervention due to risk of superinfection.   - case reviewed with Dr. Phillips.    Vascular & Interventional Radiology Brief Consult Note    HPI: 27y Female POD 21 from  presents with pain, found to have bladder flap hematoma. IR consulted for possible drainage.     Allergies:   Medications (Abx/Cardiac/Anticoagulation/Blood Products)  ertapenem  IVPB: 120 mL/Hr IV Intermittent ( @ 16:07)  piperacillin/tazobactam IVPB...: 200 mL/Hr IV Intermittent ( @ 13:31)  vancomycin  IVPB.: 250 mL/Hr IV Intermittent ( @ 14:04)    Data:    T(C): 36.7  HR: 81  BP: 113/67  RR: 18  SpO2: 100%    -WBC 5.64 / HgB 9.8 / Hct 32.2 / Plt 347  -Na 138 / Cl 105 / BUN 5 / Glucose 81  -K 4.0 / CO2 24 / Cr 0.56  -ALT -- / Alk Phos -- / T.Bili --  -INR1.20    Imaging: CT abd/pelvis with pelvic collection, likely hematoma due to internal hyperdense fluid (clot) without internal foci of air. Ultrasound shows multiloculated with extensive septations. Although collection is large, largest individual pocket is 1.5cm     Assessment/Plan:   27y Female with recent  presents with pelvic pain, found to have bladder flap hematoma. No leukocytosis or fever at this time. As per documentation, pain is under control.   Would defer drainage this time. Ultrasound confirms collection is extensively multiloculated which would not drain well due to isolated pockets of fluid. Drain placement would also risk superinfection of hematoma. If patient does not clinically improve, can consider aspiration of fluid however would otherwise limit any intervention due to risk of superinfection.   - case reviewed with Dr. Phillips.    Vascular & Interventional Radiology Brief Consult Note    HPI: 27y Female POD 21 from  presents with pain, found to have bladder flap hematoma. IR consulted for possible drainage.     Allergies:   Medications (Abx/Cardiac/Anticoagulation/Blood Products)  ertapenem  IVPB: 120 mL/Hr IV Intermittent ( @ 16:07)  piperacillin/tazobactam IVPB...: 200 mL/Hr IV Intermittent ( @ 13:31)  vancomycin  IVPB.: 250 mL/Hr IV Intermittent ( @ 14:04)    Data:    T(C): 36.7  HR: 81  BP: 113/67  RR: 18  SpO2: 100%    -WBC 5.64 / HgB 9.8 / Hct 32.2 / Plt 347  -Na 138 / Cl 105 / BUN 5 / Glucose 81  -K 4.0 / CO2 24 / Cr 0.56  -ALT -- / Alk Phos -- / T.Bili --  -INR1.20    Imaging: CT abd/pelvis with pelvic collection, likely hematoma due to internal hyperdense fluid (clot) without internal foci of air. Ultrasound shows multiloculated with extensive septations. Although collection is large, largest individual pocket is 1.5cm     Assessment/Plan:   27y Female with recent  presents with pelvic pain, found to have bladder flap hematoma. No leukocytosis or fever at this time. As per documentation, pain is under control.   Would defer drainage this time. Ultrasound confirms collection is extensively multiloculated which would not drain well due to isolated pockets of fluid. Drain placement would also risk superinfection of hematoma. If patient does not clinically improve, can consider aspiration of fluid however would otherwise limit any intervention due to risk of superinfection.   - case reviewed with Dr. Phillips.     Please page IR with any questions or concerns, Pager [phone].

## 2022-06-05 NOTE — PROGRESS NOTE ADULT - ASSESSMENT
28yo  POD#21 s/p rLTCS @35w4d 2/2 placental previa who presented to the ED with severe abdominal pain, with concern for endometritis vs postoperative infection vs possible bowel injury. Pt has been in clinically stable condition since admission, afebrile, normocardic and normotensive without leukocytosis. Pt with some tenderness appreciated on exam, CT with PO contrast pending.     #Abdominal pain  - Differential includes endometritis vs bladder hematoma infection, imaging done w/o PO abx so cannot rule out bowel injury at this time - Follow up CT with PO contrast this morning  - IV abx with Invanz (-), s/p Zosyn (22) in ED  - CTAP (22): An 8.6 cm bladder flap hematoma. Peritoneal inflammation as above concerning for peritonitis. Thickening of a small bowel loop in the pelvis may be reactive to peritoneal inflammation. A bowel injury is not excluded.       -- repeat CTAP with PO contrast, 12hours after inital CT (22 @[place])  - TVUS (22): Anterior bladder flap hematoma and hemorrhagic free fluid in the cul-de-sac as noted on CT.  - Stool cultures sent () for diarrhea, f/u results  - Afebrile, no leukocytosis  - Pain mgmt with Tylenol/Motrin/Oxy  - AM CBC/BMP/Coags    #Well Woman Care  - Reg diet  - HSQ and SCDs while in bed for DVT ppx  - Voiding spontaneously, monitor UOP  - Monitor VS, O2 sat  - Increase ambulation, encourage incentive spirometry use  - Breast pump to be placed bedside  - Con't PNVs and Fe2+    Plan of care to be re-evaluated following CT scan   Yoni PGY3

## 2022-06-05 NOTE — PROGRESS NOTE ADULT - ASSESSMENT
Linh is a 27 year old  now POD#21 s/p repeat CD admitted with abdominal pain, clinically stable at this time    1. Abdominal Pain  - CT Abdomen demonstrated 6.5 x 3.1 x 8.6 cm collection consistent with bladder hematoma with evidence of peritoneal inflammation  - Repeat CD complicated by postpartum hemorrhage with 4 units transfusion  - Receiving invanz     CT abdomen read included description that a bowel injury cannot be excluded. Given patient's presentation of 21 days post operatively, no nausea or vomiting, abdomen is nondistended, patient does not have a leukocytosis: low suspicion for bowel injury at this time.     IR consulted for possible hematoma drainage. Will follow up with their evaluation  Consider repeat CT with PO contrast for further elucidation of bowel wall.   Will follow up stool cultures as patient presented with diarrhea. No further episodes.     Continue inpatient management.   Patient seen and evaluated with Dr. Dorman  Counseled with assistance from  #[id number].  Chika Andres MD  Fellow, Maternal Fetal Medicine

## 2022-06-06 ENCOUNTER — TRANSCRIPTION ENCOUNTER (OUTPATIENT)
Age: 27
End: 2022-06-06

## 2022-06-06 ENCOUNTER — APPOINTMENT (OUTPATIENT)
Dept: OBGYN | Facility: CLINIC | Age: 27
End: 2022-06-06

## 2022-06-06 VITALS
HEART RATE: 75 BPM | DIASTOLIC BLOOD PRESSURE: 59 MMHG | OXYGEN SATURATION: 100 % | RESPIRATION RATE: 17 BRPM | TEMPERATURE: 98 F | SYSTOLIC BLOOD PRESSURE: 117 MMHG

## 2022-06-06 LAB
BASOPHILS # BLD AUTO: 0.01 K/UL — SIGNIFICANT CHANGE UP (ref 0–0.2)
BASOPHILS NFR BLD AUTO: 0.3 % — SIGNIFICANT CHANGE UP (ref 0–2)
EOSINOPHIL # BLD AUTO: 0.11 K/UL — SIGNIFICANT CHANGE UP (ref 0–0.5)
EOSINOPHIL NFR BLD AUTO: 3.1 % — SIGNIFICANT CHANGE UP (ref 0–6)
HCT VFR BLD CALC: 31.2 % — LOW (ref 34.5–45)
HGB BLD-MCNC: 9.4 G/DL — LOW (ref 11.5–15.5)
IANC: 1.56 K/UL — LOW (ref 1.8–7.4)
IMM GRANULOCYTES NFR BLD AUTO: 0.3 % — SIGNIFICANT CHANGE UP (ref 0–1.5)
LYMPHOCYTES # BLD AUTO: 1.49 K/UL — SIGNIFICANT CHANGE UP (ref 1–3.3)
LYMPHOCYTES # BLD AUTO: 42.6 % — SIGNIFICANT CHANGE UP (ref 13–44)
MCHC RBC-ENTMCNC: 23.9 PG — LOW (ref 27–34)
MCHC RBC-ENTMCNC: 30.1 GM/DL — LOW (ref 32–36)
MCV RBC AUTO: 79.2 FL — LOW (ref 80–100)
MONOCYTES # BLD AUTO: 0.32 K/UL — SIGNIFICANT CHANGE UP (ref 0–0.9)
MONOCYTES NFR BLD AUTO: 9.1 % — SIGNIFICANT CHANGE UP (ref 2–14)
NEUTROPHILS # BLD AUTO: 1.56 K/UL — LOW (ref 1.8–7.4)
NEUTROPHILS NFR BLD AUTO: 44.6 % — SIGNIFICANT CHANGE UP (ref 43–77)
NRBC # BLD: 0 /100 WBCS — SIGNIFICANT CHANGE UP
NRBC # FLD: 0 K/UL — SIGNIFICANT CHANGE UP
PLATELET # BLD AUTO: 320 K/UL — SIGNIFICANT CHANGE UP (ref 150–400)
RBC # BLD: 3.94 M/UL — SIGNIFICANT CHANGE UP (ref 3.8–5.2)
RBC # FLD: 14.2 % — SIGNIFICANT CHANGE UP (ref 10.3–14.5)
WBC # BLD: 3.5 K/UL — LOW (ref 3.8–10.5)
WBC # FLD AUTO: 3.5 K/UL — LOW (ref 3.8–10.5)

## 2022-06-06 RX ORDER — FOLIC ACID 0.8 MG
1 TABLET ORAL
Qty: 0 | Refills: 0 | DISCHARGE
Start: 2022-06-06

## 2022-06-06 RX ORDER — ACETAMINOPHEN 500 MG
3 TABLET ORAL
Qty: 0 | Refills: 0 | DISCHARGE
Start: 2022-06-06

## 2022-06-06 RX ADMIN — Medication 1 TABLET(S): at 11:35

## 2022-06-06 RX ADMIN — Medication 975 MILLIGRAM(S): at 14:28

## 2022-06-06 RX ADMIN — Medication 1 MILLIGRAM(S): at 11:35

## 2022-06-06 RX ADMIN — SODIUM CHLORIDE 125 MILLILITER(S): 9 INJECTION, SOLUTION INTRAVENOUS at 06:17

## 2022-06-06 RX ADMIN — Medication 975 MILLIGRAM(S): at 06:00

## 2022-06-06 RX ADMIN — SODIUM CHLORIDE 125 MILLILITER(S): 9 INJECTION, SOLUTION INTRAVENOUS at 08:44

## 2022-06-06 NOTE — DISCHARGE NOTE PROVIDER - HOSPITAL COURSE
28yo P2 [id number] s/p rLTCS admitted with abdominal pain and diarrhea, now resolved. D/dx on admission endometritis vs post op infection (possible post op infected bladder flap hematoma). Pt presently asymptomatic, tolerating meals, a febrile without leukocytosis.   - Pt to be discharged home with PO abx  - Close follow up with outside OB

## 2022-06-06 NOTE — CHART NOTE - NSCHARTNOTEFT_GEN_A_CORE
Pt seen and re-evaluated with Dr. Hirschberg   [id number]    Pt tolerated lunch well, no nausea or vomiting, overall feeling well. +Flatus. +BM.   Pt feels well is without any complaint and desires to go home.     Vital Signs Last 24 Hrs  T(C): 36.6 (06 Jun 2022 14:33), Max: 36.9 (05 Jun 2022 18:54)  T(F): 97.8 (06 Jun 2022 14:33), Max: 98.5 (05 Jun 2022 18:54)  HR: 75 (06 Jun 2022 14:33) (61 - 96)  BP: 117/59 (06 Jun 2022 14:33) (97/63 - 117/59)  BP(mean): --  RR: 17 (06 Jun 2022 14:33) (17 - 18)  SpO2: 100% (06 Jun 2022 14:33) (99% - 100%)    Gen NAD AOx3  Abd soft nontender   Ext nontender    28yo P2 [id number] s/p rLTCS admitted with abdominal pain and diarrhea, now resolved. D/dx on admission endometritis vs post op infection (possible post op infected bladder flap hematoma). Pt presently asymptomatic, tolerating meals, a febrile without leukocytosis.   - Pt to be discharged home with PO abx  - Close follow up with outside OB    Yoni PGY3

## 2022-06-06 NOTE — DISCHARGE NOTE PROVIDER - NSDCMRMEDTOKEN_GEN_ALL_CORE_FT
acetaminophen 325 mg oral tablet: 3 tab(s) orally every 6 hours  amoxicillin-clavulanate 875 mg-125 mg oral tablet: 1 tab(s) orally 2 times a day MDD:2 tab/day  Colace 100 mg oral capsule: 1 cap(s) orally once a day   folic acid 1 mg oral tablet: 1 tab(s) orally once a day  ibuprofen 600 mg oral tablet: 1 tab(s) orally every 6 hours

## 2022-06-06 NOTE — DISCHARGE NOTE NURSING/CASE MANAGEMENT/SOCIAL WORK - NSDCPNDISPN_GEN_ALL_CORE
Education provided on the pain management plan of care/Activities of daily living, including home environment that might     exacerbate pain or reduce effectiveness of the pain management plan of care as well as strategies to address these issues/Safe use, storage and disposal of opioids when prescribed Education provided on the pain management plan of care/Side effects of pain management treatment/Activities of daily living, including home environment that might     exacerbate pain or reduce effectiveness of the pain management plan of care as well as strategies to address these issues/Safe use, storage and disposal of opioids when prescribed

## 2022-06-06 NOTE — DISCHARGE NOTE NURSING/CASE MANAGEMENT/SOCIAL WORK - PATIENT PORTAL LINK FT
You can access the FollowMyHealth Patient Portal offered by [facility] by registering at the following website: [email or web address]. By joining [facility]’s FollowMyHealth portal, you will also be able to view your health information using other applications (apps) compatible with our system.

## 2022-06-06 NOTE — DISCHARGE NOTE NURSING/CASE MANAGEMENT/SOCIAL WORK - NSDCPNINST_GEN_ALL_CORE
Call MD with any signs of infection (i.e.: fever, redness, drainage), or with signs of bleeding, unrelieved increased pain, or persistent nausea. Safety and fall prevention measures reinforced

## 2022-06-06 NOTE — PROGRESS NOTE ADULT - ASSESSMENT
26yo  POD#22 s/p rLTCS @35w4d 2/2 placental previa who presented to the ED with severe abdominal pain, with concern for endometritis vs postoperative infection vs possible bowel injury. Pt has been in clinically stable condition since admission, afebrile, normocardic and normotensive without leukocytosis with low clinical concern for bowel injury. Pt seen by IR yesterday regarding drainage of hematoma, recommending conservative management at this time. Pt awaiting CT with PO contrast.     #Abdominal pain  - Differential includes endometritis vs bladder hematoma infection, imaging done w/o PO abx so cannot rule out bowel injury at this time - Follow up CT with PO contrast this morning  - IV abx with Invanz (-), s/p Zosyn (22) in ED  - CTAP (22): An 8.6 cm bladder flap hematoma. Peritoneal inflammation as above concerning for peritonitis. Thickening of a small bowel loop in the pelvis may be reactive to peritoneal inflammation. A bowel injury is not excluded.       -- repeat CTAP with PO contrast, 12hours after inital CT (22 @[place])  - TVUS (22): Anterior bladder flap hematoma and hemorrhagic free fluid in the cul-de-sac as noted on CT.  - Afebrile, no leukocytosis  - Pain mgmt with Tylenol/Motrin/Oxy  - AM CBC  - Appreciate IR Recs  - Consider transition to PO abx following CT this morning    #Well Woman Care  - Reg diet  - HSQ and SCDs while in bed for DVT ppx  - Voiding spontaneously, monitor UOP  - Monitor VS, O2 sat  - Increase ambulation, encourage incentive spirometry use  - Breast pump to be placed bedside  - Con't PNVs and Fe2+    Plan of care to be re-evaluated following CT scan   Yoni PGY3       26yo  POD#22 s/p rLTCS @35w4d 2/2 placental previa who presented to the ED with severe abdominal pain, with concern for endometritis vs postoperative infection vs possible bowel injury. Pt has been in clinically stable condition since admission, afebrile, normocardic and normotensive without leukocytosis with low clinical concern for bowel injury. Pt seen by IR yesterday regarding drainage of hematoma, recommending conservative management at this time. Pt awaiting CT with PO contrast.     #Abdominal pain  - Differential includes endometritis vs bladder hematoma infection, imaging done w/o PO abx so cannot rule out bowel injury at this time - Follow up CT with PO contrast this morning  - IV abx with Invanz (-), s/p Zosyn (22) in ED  - CTAP (22): An 8.6 cm bladder flap hematoma. Peritoneal inflammation as above concerning for peritonitis. Thickening of a small bowel loop in the pelvis may be reactive to peritoneal inflammation. A bowel injury is not excluded.       -- repeat CTAP with PO contrast, 12hours after inital CT (22 @[place])  - TVUS (22): Anterior bladder flap hematoma and hemorrhagic free fluid in the cul-de-sac as noted on CT.  - Afebrile, no leukocytosis  - Pain mgmt with Tylenol/Motrin/Oxy  - AM CBC  - Appreciate IR Recs  - Consider transition to PO abx following CT this morning    #Well Woman Care  - Reg diet  - HSQ and SCDs while in bed for DVT ppx  - Voiding spontaneously, monitor UOP  - Monitor VS, O2 sat  - Increase ambulation, encourage incentive spirometry use  - Breast pump to be placed bedside  - Con't PNVs and Fe2+    Plan of care to be re-evaluated following CT scan   Yoni PGY3    Attestation    Utilized   26yo  POD#22 s/p rLTCS @35w4d 2/2 placental previa who presented to the ED with severe abdominal pain and diarrhea, with concern for endometritis vs postoperative infection. Patient is feeling well this morning. She states that she was able to tolerate dinner last night without any abdominal pain and she looks forward to breakfast. She denies any nausea or vomiting. She had three bowel movements yesterday however none this morning. Patient denies any additional abdominal pain, fevers or chills. Given improvement no need for repeat CT at this time. Plan to continue on invanz and await stool cultures which are pending. Will transition to PO antibiotics pending stool cultures.   Carly Hirschberg, MFM Fellow   Patient seen and examined with DELICIA Chen Attending

## 2022-06-06 NOTE — DISCHARGE NOTE NURSING/CASE MANAGEMENT/SOCIAL WORK - NSDCPEFALRISK_GEN_ALL_CORE
For information on Fall & Injury Prevention, visit: [email or web address] OR  [email or web address] OR  https://www.cdc.gov/steadi/patient.html

## 2022-06-07 DIAGNOSIS — Z98.51 TUBAL LIGATION STATUS: ICD-10-CM

## 2022-06-07 DIAGNOSIS — Z98.891 HISTORY OF UTERINE SCAR FROM PREVIOUS SURGERY: ICD-10-CM

## 2022-06-07 DIAGNOSIS — D62 ACUTE POSTHEMORRHAGIC ANEMIA: ICD-10-CM

## 2022-06-07 DIAGNOSIS — R39.9 UNSPECIFIED SYMPTOMS AND SIGNS INVOLVING THE GENITOURINARY SYSTEM: ICD-10-CM

## 2022-06-10 PROBLEM — Z78.9 OTHER SPECIFIED HEALTH STATUS: Chronic | Status: ACTIVE | Noted: 2022-06-04

## 2022-06-13 ENCOUNTER — APPOINTMENT (OUTPATIENT)
Dept: OBGYN | Facility: CLINIC | Age: 27
End: 2022-06-13
Payer: MEDICAID

## 2022-06-13 ENCOUNTER — OUTPATIENT (OUTPATIENT)
Dept: OUTPATIENT SERVICES | Facility: HOSPITAL | Age: 27
LOS: 1 days | End: 2022-06-13
Payer: MEDICAID

## 2022-06-13 VITALS
WEIGHT: 156 LBS | OXYGEN SATURATION: 99 % | SYSTOLIC BLOOD PRESSURE: 97 MMHG | BODY MASS INDEX: 25.07 KG/M2 | HEIGHT: 66 IN | RESPIRATION RATE: 18 BRPM | TEMPERATURE: 97.3 F | DIASTOLIC BLOOD PRESSURE: 62 MMHG | HEART RATE: 63 BPM

## 2022-06-13 DIAGNOSIS — Z00.00 ENCOUNTER FOR GENERAL ADULT MEDICAL EXAMINATION WITHOUT ABNORMAL FINDINGS: ICD-10-CM

## 2022-06-13 DIAGNOSIS — D62 ACUTE POSTHEMORRHAGIC ANEMIA: ICD-10-CM

## 2022-06-13 DIAGNOSIS — Z98.891 HISTORY OF UTERINE SCAR FROM PREVIOUS SURGERY: Chronic | ICD-10-CM

## 2022-06-13 PROCEDURE — 87086 URINE CULTURE/COLONY COUNT: CPT

## 2022-06-13 PROCEDURE — G0463: CPT

## 2022-06-13 PROCEDURE — 99214 OFFICE O/P EST MOD 30 MIN: CPT

## 2022-06-13 NOTE — HISTORY OF PRESENT ILLNESS
[3/10] : the patient reports pain that is 3/10 in severity [Fever] : no fever [Chills] : no chills [Nausea] : no nausea [Vomiting] : no vomiting [Diarrhea] : no diarrhea [Vaginal Bleeding] : no vaginal bleeding [Pelvic Pressure] : no pelvic pressure [Dysuria] : dysuria [Vaginal Discharge] : no vaginal discharge [Constipation] : no constipation [Erythema] : not erythematous [Swelling] : not swollen [Dehiscence] : dehisced [Healed] : not healed [Discharge] : absent of discharge [Doing Well] : is doing well [Fair Pain Control] : has fair pain control [No Sign of Infection] : is showing no signs of infection [Sutures Removed] : sutures were removed [No Joice] : to avoid sexual intercourse [No Tampons/Suppositories] : against using tampons or vaginal suppositories [Limited Work] : to work with limitations [de-identified] : Presents for f/u after diagnosis of post-op hematoma (06/04/22), s/ p IV antibiotics x 2 days, discharged (06/06/22) complaining of urinary symptoms, feels minimal pain at incision site currently, no associated fever since discharged from hospital on (06/06/22) [de-identified] : ~ 5 mm incision separation noted on the right side of scar, no sign of infection, subcutaneous tissue intact, unable to express any serosanguineous fluid. [de-identified] : Post Op Incision Check / Urinary symptoms [de-identified] : Wound care reviewed / Urine Cx repeated / RTO 1 week for incision check

## 2022-06-15 DIAGNOSIS — R39.9 UNSPECIFIED SYMPTOMS AND SIGNS INVOLVING THE GENITOURINARY SYSTEM: ICD-10-CM

## 2022-06-15 DIAGNOSIS — Z98.891 HISTORY OF UTERINE SCAR FROM PREVIOUS SURGERY: ICD-10-CM

## 2022-06-15 DIAGNOSIS — S37.62XA: ICD-10-CM

## 2022-06-16 LAB — BACTERIA UR CULT: NORMAL

## 2022-06-22 ENCOUNTER — APPOINTMENT (OUTPATIENT)
Dept: OBGYN | Facility: CLINIC | Age: 27
End: 2022-06-22
Payer: MEDICAID

## 2022-06-22 ENCOUNTER — OUTPATIENT (OUTPATIENT)
Dept: OUTPATIENT SERVICES | Facility: HOSPITAL | Age: 27
LOS: 1 days | End: 2022-06-22
Payer: MEDICAID

## 2022-06-22 VITALS
RESPIRATION RATE: 18 BRPM | TEMPERATURE: 97 F | HEIGHT: 66 IN | BODY MASS INDEX: 25.55 KG/M2 | WEIGHT: 159 LBS | SYSTOLIC BLOOD PRESSURE: 110 MMHG | OXYGEN SATURATION: 96 % | DIASTOLIC BLOOD PRESSURE: 76 MMHG | HEART RATE: 66 BPM

## 2022-06-22 DIAGNOSIS — Z34.00 ENCOUNTER FOR SUPERVISION OF NORMAL FIRST PREGNANCY, UNSPECIFIED TRIMESTER: ICD-10-CM

## 2022-06-22 DIAGNOSIS — Z98.891 HISTORY OF UTERINE SCAR FROM PREVIOUS SURGERY: Chronic | ICD-10-CM

## 2022-06-22 DIAGNOSIS — S37.62XA: ICD-10-CM

## 2022-06-22 DIAGNOSIS — R39.9 UNSPECIFIED SYMPTOMS AND SIGNS INVOLVING THE GENITOURINARY SYSTEM: ICD-10-CM

## 2022-06-22 PROCEDURE — 99214 OFFICE O/P EST MOD 30 MIN: CPT

## 2022-06-22 PROCEDURE — G0463: CPT

## 2022-06-22 NOTE — HISTORY OF PRESENT ILLNESS
[Postpartum Follow Up] : postpartum follow up [Delivery Date: ___] : on [unfilled] [Repeat C/S] : delivered by  section (repeat) [Female] : Delivery History: baby girl [Wt. ___] : weighing [unfilled] [BTL] : bilateral tubal ligation completed [Breastfeeding] : currently nursing [Discharge HCT: ___] : hematocrit level was [unfilled] [Discharge HGB: ___] : hemoglobin level was [unfilled] [Rhogam] : Rhogam was not administered [Rubella Vaccine] : Rubella vaccine was not administered [Pertussis Vaccine] : Pertussis vaccine was not administered [BF with Difficulty] : nursing without difficulty [Resumed Menses] : has not resumed her menses [Resumed Whitten] : has not resumed intercourse [Intended Contraception] : the patient does not intended to use contraception postpartum [Back to Normal] : is back to normal in size [Cervix Sample Taken] : cervical sample not taken for a Pap smear [Doing Well] : is doing well [No Sign of Infection] : is showing no signs of infection [Excellent Pain Control] : has excellent pain control [Sutures Removed] : sutures were removed [None] : None [FreeTextEntry8] : S/P BTL / No complaints / Breastfeeding baby, released from NICU 2 weeks ago, doing well [de-identified] : Postpartum exam / Contraception  [de-identified] : S/P BTL - counseling provided / RTO for annual gyn exam and Pap in 3 months

## 2022-06-24 DIAGNOSIS — Z98.51 TUBAL LIGATION STATUS: ICD-10-CM

## 2022-06-24 DIAGNOSIS — Z98.891 HISTORY OF UTERINE SCAR FROM PREVIOUS SURGERY: ICD-10-CM

## 2022-06-28 ENCOUNTER — APPOINTMENT (OUTPATIENT)
Dept: OBGYN | Facility: CLINIC | Age: 27
End: 2022-06-28

## 2022-09-26 ENCOUNTER — APPOINTMENT (OUTPATIENT)
Dept: OBGYN | Facility: CLINIC | Age: 27
End: 2022-09-26

## 2022-09-26 ENCOUNTER — OUTPATIENT (OUTPATIENT)
Dept: OUTPATIENT SERVICES | Facility: HOSPITAL | Age: 27
LOS: 1 days | End: 2022-09-26
Payer: MEDICAID

## 2022-09-26 VITALS
HEIGHT: 66 IN | HEART RATE: 66 BPM | BODY MASS INDEX: 26.56 KG/M2 | RESPIRATION RATE: 18 BRPM | WEIGHT: 165.25 LBS | OXYGEN SATURATION: 98 % | SYSTOLIC BLOOD PRESSURE: 99 MMHG | TEMPERATURE: 98.1 F | DIASTOLIC BLOOD PRESSURE: 63 MMHG

## 2022-09-26 DIAGNOSIS — Z78.9 OTHER SPECIFIED HEALTH STATUS: ICD-10-CM

## 2022-09-26 DIAGNOSIS — Z01.419 ENCOUNTER FOR GYNECOLOGICAL EXAMINATION (GENERAL) (ROUTINE) W/OUT ABNORMAL FINDINGS: ICD-10-CM

## 2022-09-26 DIAGNOSIS — Z98.891 HISTORY OF UTERINE SCAR FROM PREVIOUS SURGERY: ICD-10-CM

## 2022-09-26 DIAGNOSIS — Z98.891 HISTORY OF UTERINE SCAR FROM PREVIOUS SURGERY: Chronic | ICD-10-CM

## 2022-09-26 DIAGNOSIS — Z12.39 ENCOUNTER FOR OTHER SCREENING FOR MALIGNANT NEOPLASM OF BREAST: ICD-10-CM

## 2022-09-26 DIAGNOSIS — Z00.00 ENCOUNTER FOR GENERAL ADULT MEDICAL EXAMINATION WITHOUT ABNORMAL FINDINGS: ICD-10-CM

## 2022-09-26 DIAGNOSIS — Z98.51 TUBAL LIGATION STATUS: ICD-10-CM

## 2022-09-26 DIAGNOSIS — Z11.3 ENCOUNTER FOR SCREENING FOR INFECTIONS WITH A PREDOMINANTLY SEXUAL MODE OF TRANSMISSION: ICD-10-CM

## 2022-09-26 PROCEDURE — G0463: CPT

## 2022-09-26 PROCEDURE — 99214 OFFICE O/P EST MOD 30 MIN: CPT

## 2022-09-26 PROCEDURE — 87491 CHLMYD TRACH DNA AMP PROBE: CPT

## 2022-09-26 PROCEDURE — 87591 N.GONORRHOEAE DNA AMP PROB: CPT

## 2022-09-26 NOTE — HISTORY OF PRESENT ILLNESS
[N] : Patient reports normal menses [Y] : Positive pregnancy history [PapSmeardate] : no hx [LMPDate] : 9/11/22 [MensesFreq] : 29 [MensesLength] : 5 [MensesAmount] : regular [PGHxTotal] : 4 [[facility]FullTerm] : 2 [PGHxPremature] : 0 [PGHxAbortions] : 2 [[facility]Living] : 2 [FreeTextEntry1] :  x 2 - Boys [No] : Patient does not have concerns regarding sex [Currently Active] : currently active [Men] : men [Vaginal] : vaginal [FreeTextEntry3] : BTL

## 2022-09-26 NOTE — PHYSICAL EXAM

## 2022-09-27 DIAGNOSIS — Z12.39 ENCOUNTER FOR OTHER SCREENING FOR MALIGNANT NEOPLASM OF BREAST: ICD-10-CM

## 2022-09-27 DIAGNOSIS — Z01.419 ENCOUNTER FOR GYNECOLOGICAL EXAMINATION (GENERAL) (ROUTINE) WITHOUT ABNORMAL FINDINGS: ICD-10-CM

## 2022-09-27 DIAGNOSIS — Z78.9 OTHER SPECIFIED HEALTH STATUS: ICD-10-CM

## 2022-09-27 DIAGNOSIS — Z98.891 HISTORY OF UTERINE SCAR FROM PREVIOUS SURGERY: ICD-10-CM

## 2022-09-27 DIAGNOSIS — Z98.51 TUBAL LIGATION STATUS: ICD-10-CM

## 2022-09-27 DIAGNOSIS — Z11.3 ENCOUNTER FOR SCREENING FOR INFECTIONS WITH A PREDOMINANTLY SEXUAL MODE OF TRANSMISSION: ICD-10-CM

## 2022-09-27 LAB
C TRACH RRNA SPEC QL NAA+PROBE: NOT DETECTED
N GONORRHOEA RRNA SPEC QL NAA+PROBE: NOT DETECTED
SOURCE TP AMPLIFICATION: NORMAL

## 2022-10-03 LAB — CYTOLOGY CVX/VAG DOC THIN PREP: NORMAL

## 2024-09-19 NOTE — ED PROVIDER NOTE - CPE EDP CARDIAC NORM
normal... Call the Concussion Management Program at [phone] for further evaluation and management of possible concussion.  Tylenol (acetaminophen) two tablets every 4-6 hours or Motrin (Ibuprofen) 2-3 tabs every 6-8 hours if needed.  No work tomorrow.

## 2025-01-14 ENCOUNTER — NON-APPOINTMENT (OUTPATIENT)
Age: 30
End: 2025-01-14